# Patient Record
Sex: MALE | Race: WHITE | NOT HISPANIC OR LATINO
[De-identification: names, ages, dates, MRNs, and addresses within clinical notes are randomized per-mention and may not be internally consistent; named-entity substitution may affect disease eponyms.]

---

## 2017-04-27 ENCOUNTER — MESSAGE (OUTPATIENT)
Age: 77
End: 2017-04-27

## 2017-04-27 PROBLEM — Z00.00 ENCOUNTER FOR PREVENTIVE HEALTH EXAMINATION: Status: ACTIVE | Noted: 2017-04-27

## 2017-04-28 ENCOUNTER — MOBILE ON CALL (OUTPATIENT)
Age: 77
End: 2017-04-28

## 2017-04-28 ENCOUNTER — RX RENEWAL (OUTPATIENT)
Age: 77
End: 2017-04-28

## 2017-04-28 RX ORDER — SILDENAFIL 20 MG/1
20 TABLET ORAL
Qty: 30 | Refills: 5 | Status: ACTIVE | COMMUNITY
Start: 2017-04-28 | End: 1900-01-01

## 2017-10-19 ENCOUNTER — APPOINTMENT (OUTPATIENT)
Dept: UROLOGY | Facility: CLINIC | Age: 77
End: 2017-10-19
Payer: MEDICARE

## 2017-10-19 VITALS
HEART RATE: 65 BPM | BODY MASS INDEX: 28.49 KG/M2 | DIASTOLIC BLOOD PRESSURE: 69 MMHG | WEIGHT: 188 LBS | SYSTOLIC BLOOD PRESSURE: 160 MMHG | HEIGHT: 68 IN

## 2017-10-19 DIAGNOSIS — Z78.9 OTHER SPECIFIED HEALTH STATUS: ICD-10-CM

## 2017-10-19 DIAGNOSIS — Z87.891 PERSONAL HISTORY OF NICOTINE DEPENDENCE: ICD-10-CM

## 2017-10-19 DIAGNOSIS — I10 ESSENTIAL (PRIMARY) HYPERTENSION: ICD-10-CM

## 2017-10-19 DIAGNOSIS — Z82.49 FAMILY HISTORY OF ISCHEMIC HEART DISEASE AND OTHER DISEASES OF THE CIRCULATORY SYSTEM: ICD-10-CM

## 2017-10-19 DIAGNOSIS — Z82.3 FAMILY HISTORY OF STROKE: ICD-10-CM

## 2017-10-19 DIAGNOSIS — E78.00 PURE HYPERCHOLESTEROLEMIA, UNSPECIFIED: ICD-10-CM

## 2017-10-19 LAB
BILIRUB UR QL STRIP: NORMAL
CLARITY UR: CLEAR
COLLECTION METHOD: NORMAL
GLUCOSE UR-MCNC: NORMAL
HCG UR QL: NORMAL EU/DL
HGB UR QL STRIP.AUTO: NORMAL
KETONES UR-MCNC: NORMAL
LEUKOCYTE ESTERASE UR QL STRIP: NORMAL
NITRITE UR QL STRIP: NORMAL
PH UR STRIP: 6
PROT UR STRIP-MCNC: NORMAL
SP GR UR STRIP: 1.01

## 2017-10-19 PROCEDURE — 81003 URINALYSIS AUTO W/O SCOPE: CPT | Mod: QW

## 2017-10-19 PROCEDURE — 99214 OFFICE O/P EST MOD 30 MIN: CPT

## 2017-10-19 RX ORDER — AMLODIPINE BESYLATE 5 MG/1
5 TABLET ORAL
Refills: 0 | Status: ACTIVE | COMMUNITY

## 2017-10-19 RX ORDER — LOVASTATIN 40 MG/1
40 TABLET ORAL
Refills: 0 | Status: ACTIVE | COMMUNITY

## 2017-10-19 RX ORDER — VALSARTAN 320 MG/1
320 TABLET, COATED ORAL
Refills: 0 | Status: ACTIVE | COMMUNITY

## 2017-10-20 RX ORDER — SILDENAFIL 20 MG/1
20 TABLET ORAL
Qty: 10 | Refills: 5 | Status: ACTIVE | COMMUNITY
Start: 2017-10-20 | End: 1900-01-01

## 2018-02-08 ENCOUNTER — RX RENEWAL (OUTPATIENT)
Age: 78
End: 2018-02-08

## 2018-02-08 RX ORDER — SILDENAFIL 20 MG/1
20 TABLET ORAL
Qty: 30 | Refills: 5 | Status: ACTIVE | COMMUNITY
Start: 2018-02-08 | End: 1900-01-01

## 2018-08-03 ENCOUNTER — APPOINTMENT (OUTPATIENT)
Dept: OTOLARYNGOLOGY | Facility: CLINIC | Age: 78
End: 2018-08-03
Payer: MEDICARE

## 2018-08-03 VITALS
SYSTOLIC BLOOD PRESSURE: 142 MMHG | DIASTOLIC BLOOD PRESSURE: 88 MMHG | WEIGHT: 180 LBS | HEIGHT: 68 IN | BODY MASS INDEX: 27.28 KG/M2

## 2018-08-03 DIAGNOSIS — Z63.4 DISAPPEARANCE AND DEATH OF FAMILY MEMBER: ICD-10-CM

## 2018-08-03 PROCEDURE — 92557 COMPREHENSIVE HEARING TEST: CPT

## 2018-08-03 PROCEDURE — 99204 OFFICE O/P NEW MOD 45 MIN: CPT | Mod: 25

## 2018-08-03 PROCEDURE — 92567 TYMPANOMETRY: CPT

## 2018-08-03 SDOH — SOCIAL STABILITY - SOCIAL INSECURITY: DISSAPEARANCE AND DEATH OF FAMILY MEMBER: Z63.4

## 2018-08-21 ENCOUNTER — FORM ENCOUNTER (OUTPATIENT)
Age: 78
End: 2018-08-21

## 2018-08-22 ENCOUNTER — OUTPATIENT (OUTPATIENT)
Dept: OUTPATIENT SERVICES | Facility: HOSPITAL | Age: 78
LOS: 1 days | Discharge: HOME | End: 2018-08-22

## 2018-08-22 DIAGNOSIS — H90.2 CONDUCTIVE HEARING LOSS, UNSPECIFIED: ICD-10-CM

## 2018-09-14 ENCOUNTER — APPOINTMENT (OUTPATIENT)
Dept: OTOLARYNGOLOGY | Facility: CLINIC | Age: 78
End: 2018-09-14

## 2018-10-25 ENCOUNTER — APPOINTMENT (OUTPATIENT)
Dept: UROLOGY | Facility: CLINIC | Age: 78
End: 2018-10-25
Payer: MEDICARE

## 2018-10-25 VITALS
DIASTOLIC BLOOD PRESSURE: 67 MMHG | SYSTOLIC BLOOD PRESSURE: 148 MMHG | HEART RATE: 61 BPM | WEIGHT: 194 LBS | BODY MASS INDEX: 29.4 KG/M2 | HEIGHT: 68 IN

## 2018-10-25 DIAGNOSIS — N40.0 BENIGN PROSTATIC HYPERPLASIA WITHOUT LOWER URINARY TRACT SYMPMS: ICD-10-CM

## 2018-10-25 DIAGNOSIS — N52.9 MALE ERECTILE DYSFUNCTION, UNSPECIFIED: ICD-10-CM

## 2018-10-25 LAB
BILIRUB UR QL STRIP: NORMAL
CLARITY UR: CLEAR
COLLECTION METHOD: NORMAL
GLUCOSE UR-MCNC: NORMAL
HCG UR QL: NORMAL EU/DL
HGB UR QL STRIP.AUTO: NORMAL
KETONES UR-MCNC: NORMAL
LEUKOCYTE ESTERASE UR QL STRIP: NORMAL
NITRITE UR QL STRIP: NORMAL
PH UR STRIP: 7
PROT UR STRIP-MCNC: NORMAL
SP GR UR STRIP: 1005

## 2018-10-25 PROCEDURE — 81003 URINALYSIS AUTO W/O SCOPE: CPT | Mod: QW

## 2018-10-25 PROCEDURE — 99213 OFFICE O/P EST LOW 20 MIN: CPT

## 2018-10-25 RX ORDER — SILDENAFIL 20 MG/1
20 TABLET ORAL
Qty: 300 | Refills: 3 | Status: ACTIVE | COMMUNITY
Start: 2018-10-25 | End: 1900-01-01

## 2018-11-09 ENCOUNTER — APPOINTMENT (OUTPATIENT)
Dept: OTOLARYNGOLOGY | Facility: CLINIC | Age: 78
End: 2018-11-09
Payer: COMMERCIAL

## 2018-11-09 DIAGNOSIS — C91.90 LYMPHOID LEUKEMIA, UNSPECIFIED NOT HAVING ACHIEVED REMISSION: ICD-10-CM

## 2018-11-09 PROCEDURE — 92570 ACOUSTIC IMMITANCE TESTING: CPT

## 2018-11-09 PROCEDURE — 92557 COMPREHENSIVE HEARING TEST: CPT

## 2018-11-09 PROCEDURE — 99213 OFFICE O/P EST LOW 20 MIN: CPT | Mod: 25

## 2019-02-25 ENCOUNTER — INPATIENT (INPATIENT)
Facility: HOSPITAL | Age: 79
LOS: 2 days | Discharge: HOME | End: 2019-02-28
Attending: INTERNAL MEDICINE | Admitting: INTERNAL MEDICINE
Payer: MEDICARE

## 2019-02-25 VITALS
TEMPERATURE: 98 F | OXYGEN SATURATION: 98 % | DIASTOLIC BLOOD PRESSURE: 58 MMHG | SYSTOLIC BLOOD PRESSURE: 129 MMHG | RESPIRATION RATE: 19 BRPM | HEART RATE: 74 BPM

## 2019-02-25 DIAGNOSIS — Z98.890 OTHER SPECIFIED POSTPROCEDURAL STATES: Chronic | ICD-10-CM

## 2019-02-25 LAB
ALBUMIN SERPL ELPH-MCNC: 3.6 G/DL — SIGNIFICANT CHANGE UP (ref 3.5–5.2)
ALP SERPL-CCNC: 64 U/L — SIGNIFICANT CHANGE UP (ref 30–115)
ALT FLD-CCNC: 23 U/L — SIGNIFICANT CHANGE UP (ref 0–41)
ANION GAP SERPL CALC-SCNC: 16 MMOL/L — HIGH (ref 7–14)
APTT BLD: 25.7 SEC — LOW (ref 27–39.2)
AST SERPL-CCNC: 31 U/L — SIGNIFICANT CHANGE UP (ref 0–41)
BASE EXCESS BLDV CALC-SCNC: -0.7 MMOL/L — SIGNIFICANT CHANGE UP (ref -2–2)
BILIRUB SERPL-MCNC: 0.6 MG/DL — SIGNIFICANT CHANGE UP (ref 0.2–1.2)
BLD GP AB SCN SERPL QL: SIGNIFICANT CHANGE UP
BUN SERPL-MCNC: 33 MG/DL — HIGH (ref 10–20)
CA-I SERPL-SCNC: 1.16 MMOL/L — SIGNIFICANT CHANGE UP (ref 1.12–1.3)
CALCIUM SERPL-MCNC: 8.2 MG/DL — LOW (ref 8.5–10.1)
CHLORIDE SERPL-SCNC: 98 MMOL/L — SIGNIFICANT CHANGE UP (ref 98–110)
CO2 SERPL-SCNC: 21 MMOL/L — SIGNIFICANT CHANGE UP (ref 17–32)
CREAT SERPL-MCNC: 1.4 MG/DL — SIGNIFICANT CHANGE UP (ref 0.7–1.5)
GAS PNL BLDV: 133 MMOL/L — LOW (ref 136–145)
GAS PNL BLDV: SIGNIFICANT CHANGE UP
GLUCOSE SERPL-MCNC: 168 MG/DL — HIGH (ref 70–99)
HCO3 BLDV-SCNC: 25 MMOL/L — SIGNIFICANT CHANGE UP (ref 22–29)
HCT VFR BLD CALC: 24.7 % — LOW (ref 42–52)
HCT VFR BLDA CALC: 33.7 % — LOW (ref 34–44)
HGB BLD CALC-MCNC: 11 G/DL — LOW (ref 14–18)
HGB BLD-MCNC: 7.4 G/DL — CRITICAL LOW (ref 14–18)
INR BLD: 1.24 RATIO — SIGNIFICANT CHANGE UP (ref 0.65–1.3)
LACTATE BLDV-MCNC: 1.3 MMOL/L — SIGNIFICANT CHANGE UP (ref 0.5–1.6)
MCHC RBC-ENTMCNC: 30 G/DL — LOW (ref 32–37)
MCHC RBC-ENTMCNC: 31.9 PG — HIGH (ref 27–31)
MCV RBC AUTO: 106.5 FL — HIGH (ref 80–94)
NRBC # BLD: 0 /100 WBCS — SIGNIFICANT CHANGE UP (ref 0–0)
NT-PROBNP SERPL-SCNC: 1599 PG/ML — HIGH (ref 0–300)
PCO2 BLDV: 43 MMHG — SIGNIFICANT CHANGE UP (ref 41–51)
PH BLDV: 7.37 — SIGNIFICANT CHANGE UP (ref 7.26–7.43)
PLATELET # BLD AUTO: 92 K/UL — LOW (ref 130–400)
PO2 BLDV: 23 MMHG — SIGNIFICANT CHANGE UP (ref 20–40)
POTASSIUM BLDV-SCNC: 4 MMOL/L — SIGNIFICANT CHANGE UP (ref 3.3–5.6)
POTASSIUM SERPL-MCNC: 4.5 MMOL/L — SIGNIFICANT CHANGE UP (ref 3.5–5)
POTASSIUM SERPL-SCNC: 4.5 MMOL/L — SIGNIFICANT CHANGE UP (ref 3.5–5)
PROT SERPL-MCNC: 5.5 G/DL — LOW (ref 6–8)
PROTHROM AB SERPL-ACNC: 14.2 SEC — HIGH (ref 9.95–12.87)
RBC # BLD: 2.32 M/UL — LOW (ref 4.7–6.1)
RBC # FLD: 17.1 % — HIGH (ref 11.5–14.5)
SAO2 % BLDV: 37 % — SIGNIFICANT CHANGE UP
SODIUM SERPL-SCNC: 135 MMOL/L — SIGNIFICANT CHANGE UP (ref 135–146)
TROPONIN T SERPL-MCNC: <0.01 NG/ML — SIGNIFICANT CHANGE UP
TYPE + AB SCN PNL BLD: SIGNIFICANT CHANGE UP
WBC # BLD: 133.69 K/UL — CRITICAL HIGH (ref 4.8–10.8)
WBC # FLD AUTO: 133.69 K/UL — CRITICAL HIGH (ref 4.8–10.8)

## 2019-02-25 RX ORDER — SODIUM CHLORIDE 9 MG/ML
1000 INJECTION, SOLUTION INTRAVENOUS ONCE
Qty: 0 | Refills: 0 | Status: COMPLETED | OUTPATIENT
Start: 2019-02-25 | End: 2019-02-25

## 2019-02-25 RX ORDER — HEPARIN SODIUM 5000 [USP'U]/ML
5000 INJECTION INTRAVENOUS; SUBCUTANEOUS EVERY 8 HOURS
Qty: 0 | Refills: 0 | Status: DISCONTINUED | OUTPATIENT
Start: 2019-02-25 | End: 2019-02-27

## 2019-02-25 RX ORDER — AMLODIPINE BESYLATE 2.5 MG/1
5 TABLET ORAL DAILY
Qty: 0 | Refills: 0 | Status: DISCONTINUED | OUTPATIENT
Start: 2019-02-25 | End: 2019-02-28

## 2019-02-25 RX ORDER — LOSARTAN POTASSIUM 100 MG/1
1 TABLET, FILM COATED ORAL
Qty: 0 | Refills: 0 | COMMUNITY

## 2019-02-25 RX ORDER — LOSARTAN POTASSIUM 100 MG/1
100 TABLET, FILM COATED ORAL DAILY
Qty: 0 | Refills: 0 | Status: DISCONTINUED | OUTPATIENT
Start: 2019-02-25 | End: 2019-02-28

## 2019-02-25 RX ORDER — ACETAMINOPHEN 500 MG
650 TABLET ORAL EVERY 6 HOURS
Qty: 0 | Refills: 0 | Status: DISCONTINUED | OUTPATIENT
Start: 2019-02-25 | End: 2019-02-28

## 2019-02-25 RX ORDER — AMLODIPINE BESYLATE 2.5 MG/1
1 TABLET ORAL
Qty: 0 | Refills: 0 | COMMUNITY

## 2019-02-25 RX ORDER — LOVASTATIN 20 MG
1 TABLET ORAL
Qty: 0 | Refills: 0 | COMMUNITY

## 2019-02-25 RX ORDER — ATORVASTATIN CALCIUM 80 MG/1
40 TABLET, FILM COATED ORAL AT BEDTIME
Qty: 0 | Refills: 0 | Status: DISCONTINUED | OUTPATIENT
Start: 2019-02-25 | End: 2019-02-28

## 2019-02-25 RX ORDER — AZITHROMYCIN 500 MG/1
500 TABLET, FILM COATED ORAL EVERY 24 HOURS
Qty: 0 | Refills: 0 | Status: DISCONTINUED | OUTPATIENT
Start: 2019-02-25 | End: 2019-02-26

## 2019-02-25 RX ORDER — CEFTRIAXONE 500 MG/1
1 INJECTION, POWDER, FOR SOLUTION INTRAMUSCULAR; INTRAVENOUS EVERY 24 HOURS
Qty: 0 | Refills: 0 | Status: DISCONTINUED | OUTPATIENT
Start: 2019-02-25 | End: 2019-02-26

## 2019-02-25 RX ADMIN — SODIUM CHLORIDE 1000 MILLILITER(S): 9 INJECTION, SOLUTION INTRAVENOUS at 13:42

## 2019-02-25 RX ADMIN — SODIUM CHLORIDE 1000 MILLILITER(S): 9 INJECTION, SOLUTION INTRAVENOUS at 14:56

## 2019-02-25 RX ADMIN — HEPARIN SODIUM 5000 UNIT(S): 5000 INJECTION INTRAVENOUS; SUBCUTANEOUS at 22:56

## 2019-02-25 RX ADMIN — AZITHROMYCIN 255 MILLIGRAM(S): 500 TABLET, FILM COATED ORAL at 22:55

## 2019-02-25 RX ADMIN — ATORVASTATIN CALCIUM 40 MILLIGRAM(S): 80 TABLET, FILM COATED ORAL at 22:56

## 2019-02-25 NOTE — ED PROVIDER NOTE - OBJECTIVE STATEMENT
77 yo M with a hx of CLL, HTN, HLD, BPH, presents with cough and sob x 3 days. Exertional dyspnea progressively worsening. Asssociated with chills. Denies fevers, chills, abd pain, NVCD, CP, dysuria, back pain.

## 2019-02-25 NOTE — H&P ADULT - ATTENDING COMMENTS
Pt seen and examined at bedside independently, pt is c/o generalized weakness and cough, he had a high fever at home, received flu shot back in November. Pt has h/o CLL, not on treatment, follows up with oncology, known to have anemia w/o any signs of active bleeding.   He was admitted for community acquired PNA in immunocompromised.  I agree with residents findings, assessment and plan above with few corrections below.     Vital Signs Last 24 Hrs  T(C): 35.9 (26 Feb 2019 04:51), Max: 37.6 (25 Feb 2019 20:15)  T(F): 96.6 (26 Feb 2019 04:51), Max: 99.7 (25 Feb 2019 20:15)  HR: 68 (26 Feb 2019 14:19) (66 - 70)  BP: 128/62 (26 Feb 2019 14:19) (126/75 - 135/63)  BP(mean): --  RR: 18 (26 Feb 2019 14:19) (18 - 18)  SpO2: 99% (26 Feb 2019 09:32) (94% - 99%)    PHYSICAL EXAM:  GENERAL: AAOx 3 , NAD  NECK: suppler, no JVD  Skin: pale, no rash   CV: S1, S2, no murmur  Lungs: rales on right base, no wheezing   Abd/GI: soft, NT, Nd, BS present   EXT: trace edema on LE b/l     LABs:                        7.3    110.30 )-----------( 101      ( 26 Feb 2019 12:15 )             24.9   02-26    140  |  102  |  28<H>  ----------------------------<  147<H>  4.2   |  23  |  1.1    Ca    8.5      26 Feb 2019 12:15  Mg     2.7     02-26    TPro  5.4<L>  /  Alb  3.5  /  TBili  0.4  /  DBili  x   /  AST  30  /  ALT  24  /  AlkPhos  67  02-26    RADIOLOGY:  < from: Xray Chest 2 Views PA/Lat (02.25.19 @ 14:01) >    Impression:    Right lower lobe opacity consistent with pneumonia. Small pleural   effusion.     A/P  # Community acquired PNA in immunocompromised patient   - started on Cefepime and Vancomycin  - collect sputum Cx  - ID consult  - supportive treatment  - supplement oxygen, monitor pulse Ox  - nebs Q 4 hours   - Robitussin PRN  - swab for Influenza   - send nasal swab for MRSA     # CLL/ Leukocytosis  - not on treatment, f/u with oncology after discharge   - WBC trending down for last 24 hours    # Anemia  - send anemia work up  - monitor H/H, keep Hemoglobin above 7.0  - no active bleeding noted     # Elevated BNP with trace LE edema  - check 2DECho  - pt denies any h/o CAD    # Thrombocytopenia  - monitor platelets   - SCD for DVT prophylaxis  - encourage ambulation     # GI/DVT prophylaxis

## 2019-02-25 NOTE — H&P ADULT - PSH
History of moshe hole surgery    History of gastric surgery    History of lung surgery  related to infection

## 2019-02-25 NOTE — H&P ADULT - NSHPLABSRESULTS_GEN_ALL_CORE
7.4    133.69 )-----------( 92       ( 25 Feb 2019 13:19 )             24.7     02-25    135  |  98  |  33<H>  ----------------------------<  168<H>  4.5   |  21  |  1.4    Ca    8.2<L>      25 Feb 2019 13:19    TPro  5.5<L>  /  Alb  3.6  /  TBili  0.6  /  DBili  x   /  AST  31  /  ALT  23  /  AlkPhos  64  02-25      PT/INR - ( 25 Feb 2019 13:19 )   PT: 14.20 sec;   INR: 1.24 ratio    PTT - ( 25 Feb 2019 13:19 )  PTT:25.7 sec    CARDIAC MARKERS ( 25 Feb 2019 13:19 )  x     / <0.01 ng/mL / x     / x     / x        Serum Pro-Brain Natriuretic Peptide: 1599 pg/mL (02.25.19 @ 13:19)      Xray Chest 2 Views PA/Lat (02.25.19 @ 14:01) >  Right lower lobe opacity consistent with pneumonia. Small pleural   effusion.   No pneumothorax

## 2019-02-25 NOTE — ED PROVIDER NOTE - PHYSICAL EXAMINATION
AOx4, Non toxic appearing, NAD, speaking in full sentences.   Skin - warm and dry, no acute rash.   Head - normocephalic, atraumatic.   Eyes - PERRLA/EOMI, conjunctiva and sclera clear.   ENT- MM moist, no nasal discharge.  Pharynx unremarkable.  No mastoid or temporal ttp.   Neck - supple nt, no meningeal signs.   Heart - RRR s1s2 nl, no rub/murmur.   Lungs- No retractions, Crackels at the right base.   Abdomen - soft ntnd no r/g.   Extremities- moves all, +equal distal pulses, brisk cap refill, sensation wnl, normal ROM. No LE edema, calves nttp b/l.

## 2019-02-25 NOTE — H&P ADULT - PMH
CLL (chronic lymphocytic leukemia)    HLD (hyperlipidemia)    HTN (hypertension)    Subdural hematoma

## 2019-02-25 NOTE — ED PROVIDER NOTE - NS ED ROS FT
Constitutional: See HPI.  Eyes: No visual changes, eye pain or discharge. No Photophobia  ENMT: No neck pain or stiffness. No limited ROM  Cardiac: No edema. No chest pain with exertion.  Respiratory: see phi  GI: No nausea, vomiting, diarrhea or abdominal pain.  : No dysuria, frequency or burning. No Discharge  MS: No myalgia, muscle weakness, joint pain or back pain.  Neuro: No headache or weakness. No LOC.  Skin: No skin rash.  Except as documented in the HPI, all other systems are negative.

## 2019-02-25 NOTE — ED PROVIDER NOTE - ATTENDING CONTRIBUTION TO CARE
78 y M to ED with SOB  h/o CLL, HTN, presents with fever, cough and SOB x 3d, no sick contacts, no travels, no HA but + chills and exertional Dyspnea.    AVSS, exam as noted, rhonchi on R base, RRR, abdomen soft NTND, (+) bowel sounds, neuro nonfocal

## 2019-02-25 NOTE — H&P ADULT - ASSESSMENT
79 yo M with CLL not on chemotherapy, HTN and DLD presenting with worsening SOB, cough and fever x4 days.     - Sepsis 2/2 CAP  no O2 requirements, CURB 65 of 2   IV abxs: ceftriaxone and azithromycin  consider switching to po levaquin in 24 hrs  fu blood cultures  encourage po hydration     - CLL  currently off chemotherapy  fu with oncologist as OP     - MAcrocytic Anemia/ thrombocytopenia  likely 2/2 CLL  no baseline. please recall dr Gusman in am for baseline CBC  fu repeat CBC and transfuse if Hb<7  fu folate and B12 levels     - HTN  c/w losartan and amlodipine     - Elevated Crea  no baseline  fu BMP  encourage po hydration    - GI/ DVT ppx  not indicated, heparin SQ  DC heparin if plat drop further

## 2019-02-25 NOTE — H&P ADULT - HISTORY OF PRESENT ILLNESS
79 yo M with CLL not on chemotherapy, HTN and DLD presenting with worsening SOB and cough x4 days. Pt also reports fever Tmax 102 x1 day duration. no chills. + productive cough of brownish sputum.  Pt states that his SOB is worse with minimal exertion which is different from his baseline  Pt denies any chest pain, upper resp symptoms, GI or  symptoms  Pt reports easy bruisability related to low platelets and intermittent spontaneous nasal bleeds. He denies any GI bleeding. he doesnt recall his Hb level

## 2019-02-26 LAB
ALBUMIN SERPL ELPH-MCNC: 3.5 G/DL — SIGNIFICANT CHANGE UP (ref 3.5–5.2)
ALBUMIN SERPL ELPH-MCNC: 3.7 G/DL — SIGNIFICANT CHANGE UP (ref 3.5–5.2)
ALP SERPL-CCNC: 67 U/L — SIGNIFICANT CHANGE UP (ref 30–115)
ALP SERPL-CCNC: 69 U/L — SIGNIFICANT CHANGE UP (ref 30–115)
ALT FLD-CCNC: 24 U/L — SIGNIFICANT CHANGE UP (ref 0–41)
ALT FLD-CCNC: 26 U/L — SIGNIFICANT CHANGE UP (ref 0–41)
ANION GAP SERPL CALC-SCNC: 15 MMOL/L — HIGH (ref 7–14)
ANION GAP SERPL CALC-SCNC: 17 MMOL/L — HIGH (ref 7–14)
AST SERPL-CCNC: 30 U/L — SIGNIFICANT CHANGE UP (ref 0–41)
AST SERPL-CCNC: 30 U/L — SIGNIFICANT CHANGE UP (ref 0–41)
BASOPHILS # BLD AUTO: 0 K/UL — SIGNIFICANT CHANGE UP (ref 0–0.2)
BASOPHILS NFR BLD AUTO: 0 % — SIGNIFICANT CHANGE UP (ref 0–1)
BILIRUB SERPL-MCNC: 0.4 MG/DL — SIGNIFICANT CHANGE UP (ref 0.2–1.2)
BILIRUB SERPL-MCNC: 0.5 MG/DL — SIGNIFICANT CHANGE UP (ref 0.2–1.2)
BUN SERPL-MCNC: 27 MG/DL — HIGH (ref 10–20)
BUN SERPL-MCNC: 28 MG/DL — HIGH (ref 10–20)
CALCIUM SERPL-MCNC: 8.5 MG/DL — SIGNIFICANT CHANGE UP (ref 8.5–10.1)
CALCIUM SERPL-MCNC: 8.8 MG/DL — SIGNIFICANT CHANGE UP (ref 8.5–10.1)
CHLORIDE SERPL-SCNC: 101 MMOL/L — SIGNIFICANT CHANGE UP (ref 98–110)
CHLORIDE SERPL-SCNC: 102 MMOL/L — SIGNIFICANT CHANGE UP (ref 98–110)
CO2 SERPL-SCNC: 22 MMOL/L — SIGNIFICANT CHANGE UP (ref 17–32)
CO2 SERPL-SCNC: 23 MMOL/L — SIGNIFICANT CHANGE UP (ref 17–32)
CREAT SERPL-MCNC: 1.1 MG/DL — SIGNIFICANT CHANGE UP (ref 0.7–1.5)
CREAT SERPL-MCNC: 1.2 MG/DL — SIGNIFICANT CHANGE UP (ref 0.7–1.5)
EOSINOPHIL # BLD AUTO: 0 K/UL — SIGNIFICANT CHANGE UP (ref 0–0.7)
EOSINOPHIL NFR BLD AUTO: 0 % — SIGNIFICANT CHANGE UP (ref 0–8)
FLU A RESULT: NEGATIVE — SIGNIFICANT CHANGE UP
FLU A RESULT: NEGATIVE — SIGNIFICANT CHANGE UP
FLUAV AG NPH QL: NEGATIVE — SIGNIFICANT CHANGE UP
FLUBV AG NPH QL: NEGATIVE — SIGNIFICANT CHANGE UP
FOLATE SERPL-MCNC: >20 NG/ML — SIGNIFICANT CHANGE UP
GLUCOSE SERPL-MCNC: 147 MG/DL — HIGH (ref 70–99)
GLUCOSE SERPL-MCNC: 240 MG/DL — HIGH (ref 70–99)
HCT VFR BLD CALC: 24.9 % — LOW (ref 42–52)
HCT VFR BLD CALC: 26.3 % — LOW (ref 42–52)
HGB BLD-MCNC: 7.3 G/DL — CRITICAL LOW (ref 14–18)
HGB BLD-MCNC: 7.8 G/DL — LOW (ref 14–18)
LYMPHOCYTES # BLD AUTO: 88 % — HIGH (ref 20.5–51.1)
LYMPHOCYTES # BLD AUTO: 97.42 K/UL — HIGH (ref 1.2–3.4)
MAGNESIUM SERPL-MCNC: 2.7 MG/DL — HIGH (ref 1.8–2.4)
MAGNESIUM SERPL-MCNC: 2.7 MG/DL — HIGH (ref 1.8–2.4)
MCHC RBC-ENTMCNC: 29.3 G/DL — LOW (ref 32–37)
MCHC RBC-ENTMCNC: 29.7 G/DL — LOW (ref 32–37)
MCHC RBC-ENTMCNC: 31.2 PG — HIGH (ref 27–31)
MCHC RBC-ENTMCNC: 31.6 PG — HIGH (ref 27–31)
MCV RBC AUTO: 106.4 FL — HIGH (ref 80–94)
MCV RBC AUTO: 106.5 FL — HIGH (ref 80–94)
MONOCYTES # BLD AUTO: 1.88 K/UL — HIGH (ref 0.1–0.6)
MONOCYTES NFR BLD AUTO: 1.7 % — SIGNIFICANT CHANGE UP (ref 1.7–9.3)
NEUTROPHILS # BLD AUTO: 7.53 K/UL — HIGH (ref 1.4–6.5)
NEUTROPHILS NFR BLD AUTO: 5.1 % — LOW (ref 42.2–75.2)
NRBC # BLD: 0 /100 WBCS — SIGNIFICANT CHANGE UP (ref 0–0)
PLATELET # BLD AUTO: 101 K/UL — LOW (ref 130–400)
PLATELET # BLD AUTO: 103 K/UL — LOW (ref 130–400)
POTASSIUM SERPL-MCNC: 4 MMOL/L — SIGNIFICANT CHANGE UP (ref 3.5–5)
POTASSIUM SERPL-MCNC: 4.2 MMOL/L — SIGNIFICANT CHANGE UP (ref 3.5–5)
POTASSIUM SERPL-SCNC: 4 MMOL/L — SIGNIFICANT CHANGE UP (ref 3.5–5)
POTASSIUM SERPL-SCNC: 4.2 MMOL/L — SIGNIFICANT CHANGE UP (ref 3.5–5)
PROT SERPL-MCNC: 5.4 G/DL — LOW (ref 6–8)
PROT SERPL-MCNC: 5.7 G/DL — LOW (ref 6–8)
RBC # BLD: 2.34 M/UL — LOW (ref 4.7–6.1)
RBC # BLD: 2.47 M/UL — LOW (ref 4.7–6.1)
RBC # FLD: 17 % — HIGH (ref 11.5–14.5)
RBC # FLD: 17.1 % — HIGH (ref 11.5–14.5)
RSV RESULT: NEGATIVE — SIGNIFICANT CHANGE UP
RSV RNA RESP QL NAA+PROBE: NEGATIVE — SIGNIFICANT CHANGE UP
SODIUM SERPL-SCNC: 140 MMOL/L — SIGNIFICANT CHANGE UP (ref 135–146)
SODIUM SERPL-SCNC: 140 MMOL/L — SIGNIFICANT CHANGE UP (ref 135–146)
VIT B12 SERPL-MCNC: 704 PG/ML — SIGNIFICANT CHANGE UP (ref 232–1245)
WBC # BLD: 110.3 K/UL — CRITICAL HIGH (ref 4.8–10.8)
WBC # BLD: 110.71 K/UL — CRITICAL HIGH (ref 4.8–10.8)
WBC # FLD AUTO: 110.3 K/UL — CRITICAL HIGH (ref 4.8–10.8)
WBC # FLD AUTO: 110.71 K/UL — CRITICAL HIGH (ref 4.8–10.8)

## 2019-02-26 RX ORDER — VANCOMYCIN HCL 1 G
1000 VIAL (EA) INTRAVENOUS EVERY 12 HOURS
Qty: 0 | Refills: 0 | Status: DISCONTINUED | OUTPATIENT
Start: 2019-02-26 | End: 2019-02-27

## 2019-02-26 RX ORDER — CEFEPIME 1 G/1
1000 INJECTION, POWDER, FOR SOLUTION INTRAMUSCULAR; INTRAVENOUS EVERY 8 HOURS
Qty: 0 | Refills: 0 | Status: DISCONTINUED | OUTPATIENT
Start: 2019-02-26 | End: 2019-02-28

## 2019-02-26 RX ORDER — CEFEPIME 1 G/1
1000 INJECTION, POWDER, FOR SOLUTION INTRAMUSCULAR; INTRAVENOUS ONCE
Qty: 0 | Refills: 0 | Status: COMPLETED | OUTPATIENT
Start: 2019-02-26 | End: 2019-02-26

## 2019-02-26 RX ORDER — CEFEPIME 1 G/1
INJECTION, POWDER, FOR SOLUTION INTRAMUSCULAR; INTRAVENOUS
Qty: 0 | Refills: 0 | Status: DISCONTINUED | OUTPATIENT
Start: 2019-02-26 | End: 2019-02-28

## 2019-02-26 RX ADMIN — CEFTRIAXONE 100 GRAM(S): 500 INJECTION, POWDER, FOR SOLUTION INTRAMUSCULAR; INTRAVENOUS at 00:21

## 2019-02-26 RX ADMIN — ATORVASTATIN CALCIUM 40 MILLIGRAM(S): 80 TABLET, FILM COATED ORAL at 21:51

## 2019-02-26 RX ADMIN — CEFEPIME 100 MILLIGRAM(S): 1 INJECTION, POWDER, FOR SOLUTION INTRAMUSCULAR; INTRAVENOUS at 12:07

## 2019-02-26 RX ADMIN — Medication 250 MILLIGRAM(S): at 13:47

## 2019-02-26 RX ADMIN — AMLODIPINE BESYLATE 5 MILLIGRAM(S): 2.5 TABLET ORAL at 05:48

## 2019-02-26 RX ADMIN — HEPARIN SODIUM 5000 UNIT(S): 5000 INJECTION INTRAVENOUS; SUBCUTANEOUS at 21:51

## 2019-02-26 RX ADMIN — HEPARIN SODIUM 5000 UNIT(S): 5000 INJECTION INTRAVENOUS; SUBCUTANEOUS at 05:48

## 2019-02-26 RX ADMIN — CEFEPIME 100 MILLIGRAM(S): 1 INJECTION, POWDER, FOR SOLUTION INTRAMUSCULAR; INTRAVENOUS at 22:32

## 2019-02-26 RX ADMIN — HEPARIN SODIUM 5000 UNIT(S): 5000 INJECTION INTRAVENOUS; SUBCUTANEOUS at 13:47

## 2019-02-26 RX ADMIN — Medication 250 MILLIGRAM(S): at 21:51

## 2019-02-26 RX ADMIN — LOSARTAN POTASSIUM 100 MILLIGRAM(S): 100 TABLET, FILM COATED ORAL at 05:48

## 2019-02-26 NOTE — PATIENT PROFILE ADULT - VISION (WITH CORRECTIVE LENSES IF THE PATIENT USUALLY WEARS THEM):
Normal vision: sees adequately in most situations; can see medication labels, newsprint/glasses at bedside for distance and reading

## 2019-02-26 NOTE — PATIENT PROFILE ADULT - SPECIFY WHICH ONES ARE ON CHART
Medical Orders for Life-Sustaining Treatment (MOLST)/Do Not Resuscitate (DNR)/MD to fill out DNR/DNI / Molst form with pt; HCP form pt will fill out a new form now/Health Care Proxy (HCP)

## 2019-02-26 NOTE — PATIENT PROFILE ADULT - ABILITY TO HEAR (WITH HEARING AID OR HEARING APPLIANCE IF NORMALLY USED):
Mildly to Moderately Impaired: difficulty hearing in some environments or speaker may need to increase volume or speak distinctly/pt states he is having difficulty hearing the past 3-4 days, MD Magaña notified.

## 2019-02-26 NOTE — PATIENT PROFILE ADULT - INDICATE TYPE
Do Not Resuscitate (DNR)/Medical Orders for Life-Sustaining Treatment (MOLST)/pt wishes to be DNR/DNI/Health Care Proxy (HCP)

## 2019-02-26 NOTE — PROGRESS NOTE ADULT - ASSESSMENT
________________________________________________________________________________  DAILY PROGRESS NOTE:    ================== SUBJECTIVE ==================    SHI VELA  /   78y  /  Male  /  MRN#: [id number]  Patient is a 78y old Male who presents with a chief complaint of SOB/ cough (25 Feb 2019 16:05)  Currently admitted to medicine with the primary diagnosis of Pneumonia      HOSPITAL DAY: 1d     OVERNIGHT EVENTS:     TODAY: Patient was seen this morning at bedside. Currently, the patient reports ....    Review of systems is otherwise negative.    =================== OBJECTIVE ===================    VITAL SIGNS: Last 24 Hours  T(C): 35.9 (26 Feb 2019 04:51), Max: 37.6 (25 Feb 2019 20:15)  T(F): 96.6 (26 Feb 2019 04:51), Max: 99.7 (25 Feb 2019 20:15)  HR: 66 (26 Feb 2019 04:51) (66 - 70)  BP: 130/62 (26 Feb 2019 04:51) (126/75 - 135/63)  BP(mean): --  RR: 18 (26 Feb 2019 04:51) (18 - 18)  SpO2: 99% (26 Feb 2019 09:32) (94% - 99%)    PHYSICAL EXAM:  GENERAL: NAD, well-developed  CHEST/LUNG: Clear to auscultation bilaterally; No wheeze, no crackles.   HEART: Regular rate and rhythm; No murmurs, rubs, or gallops  ABDOMEN: Soft, Nontender, Nondistended; Bowel sounds present  EXTREMITIES:  2+ Peripheral Pulses, No clubbing, cyanosis, or edema  PSYCH: AAOx3  NEUROLOGY: non-focal  General:  Appearance is consistent with chronologic age.  No abnormal facies.   General: AA&Ox3.  Fund of knowledge is intact and normal.  Language with normal repetition, comprehension and naming.   SKIN: No rashes or lesions      ===================== LABS =====================                        7.3    110.30 )-----------( 101      ( 26 Feb 2019 12:15 )             24.9     02-26    140  |  101  |  27<H>  ----------------------------<  240<H>  4.0   |  22  |  1.2    Ca    8.8      26 Feb 2019 09:35  Mg     2.7     02-26    TPro  5.7<L>  /  Alb  3.7  /  TBili  0.5  /  DBili  x   /  AST  30  /  ALT  26  /  AlkPhos  69  02-26    PT/INR - ( 25 Feb 2019 13:19 )   PT: 14.20 sec;   INR: 1.24 ratio         PTT - ( 25 Feb 2019 13:19 )  PTT:25.7 sec      Troponin T, Serum: <0.01 ng/mL (02-25-19 @ 13:19)    CARDIAC MARKERS ( 25 Feb 2019 13:19 )  x     / <0.01 ng/mL / x     / x     / x        ================== IMAGING ==================    < from: Xray Chest 2 Views PA/Lat (02.25.19 @ 14:01) >  Impression:    Right lower lobe opacity consistent with pneumonia. Small pleural   effusion.     No pneumothorax    < end of copied text >    ================== ALLERGIES ===================  No Known Allergies    ==================== MEDS =====================  amLODIPine   Tablet 5 milliGRAM(s) Oral daily  atorvastatin 40 milliGRAM(s) Oral at bedtime  cefepime   IVPB      cefepime   IVPB 1000 milliGRAM(s) IV Intermittent every 8 hours  heparin  Injectable 5000 Unit(s) SubCutaneous every 8 hours  losartan 100 milliGRAM(s) Oral daily  vancomycin  IVPB 1000 milliGRAM(s) IV Intermittent every 12 hours    PRN MEDICATIONS  acetaminophen   Tablet .. 650 milliGRAM(s) Oral every 6 hours PRN      ================= ASSESS/PLAN ==================  Patient is a 78y old Male who presents with a chief complaint of SOB/ cough (25 Feb 2019 16:05)  Currently admitted to medicine with the primary diagnosis of Pneumonia      PLAN  #Sepsis 2/2 CAP  CURB 65 of 2, rest pulse ox is 97%, ambulation is 96%   was ceftriaxone and azithromycin day 2  - Cefepime, vanco (Day 1)   - Blood cultures pending   - Sent nasal flu swab     #CLL  currently off chemotherapy  fu with oncologist as OP    #MAcrocytic Anemia/ thrombocytopenia  likely 2/2 CLL  Hemoglbin stable  - Will call dr Gusman in am for baseline CBC  - TSH, T4, folate and B12 pending     #HTN  - c/w losartan and amlodipine    #Elevated Crea - RICCO vs CKD. No baseline  Downtrending   - Daily BMP     GI PPX: -  DVT PPX: Heparin     DIET: DASH  ACTIVITY:  () Ad [place]oina  /  (X) Advance as Tolerated  /  () Bed Rest  /  () Fall Precaution  /  () Seizure precaution    ================= PRESENT TODAY ==================    1-Caldera Catheter: No  Indication:  2-Vascular Access:  [X]Peripheral | Indication: Antibiotics   []Midline | Indication:   []PICC Line | Indication:  []CVC | Indication:  []Arterial Line | Indication:  []Uldall Catheter | Indication:   3-IV Fluids: | Indication:  4-Ventilation: NC 2L | Sat: 98%     ================= DISPOSITION ==================    Patient to be discharged when condition(s) optimized.            Discharge to:  (X) Home  /  () SNF  /  () [facility]HA /  () Hospice / () Other     ================= CODE STATUS =================                  (X) FULL CODE     |     () DNR     |     () DNI    () Discussion with patient and/or family regarding goals of care

## 2019-02-27 DIAGNOSIS — D69.6 THROMBOCYTOPENIA, UNSPECIFIED: ICD-10-CM

## 2019-02-27 DIAGNOSIS — J18.9 PNEUMONIA, UNSPECIFIED ORGANISM: ICD-10-CM

## 2019-02-27 DIAGNOSIS — I10 ESSENTIAL (PRIMARY) HYPERTENSION: ICD-10-CM

## 2019-02-27 DIAGNOSIS — E87.70 FLUID OVERLOAD, UNSPECIFIED: ICD-10-CM

## 2019-02-27 DIAGNOSIS — D64.9 ANEMIA, UNSPECIFIED: ICD-10-CM

## 2019-02-27 DIAGNOSIS — C91.90 LYMPHOID LEUKEMIA, UNSPECIFIED NOT HAVING ACHIEVED REMISSION: ICD-10-CM

## 2019-02-27 DIAGNOSIS — E78.5 HYPERLIPIDEMIA, UNSPECIFIED: ICD-10-CM

## 2019-02-27 LAB
ANION GAP SERPL CALC-SCNC: 11 MMOL/L — SIGNIFICANT CHANGE UP (ref 7–14)
BASOPHILS # BLD AUTO: 0.05 K/UL — SIGNIFICANT CHANGE UP (ref 0–0.2)
BASOPHILS NFR BLD AUTO: 0 % — SIGNIFICANT CHANGE UP (ref 0–1)
BLD GP AB SCN SERPL QL: SIGNIFICANT CHANGE UP
BUN SERPL-MCNC: 24 MG/DL — HIGH (ref 10–20)
CALCIUM SERPL-MCNC: 8.6 MG/DL — SIGNIFICANT CHANGE UP (ref 8.5–10.1)
CHLORIDE SERPL-SCNC: 110 MMOL/L — SIGNIFICANT CHANGE UP (ref 98–110)
CO2 SERPL-SCNC: 22 MMOL/L — SIGNIFICANT CHANGE UP (ref 17–32)
CREAT SERPL-MCNC: 1.1 MG/DL — SIGNIFICANT CHANGE UP (ref 0.7–1.5)
EOSINOPHIL # BLD AUTO: 0.16 K/UL — SIGNIFICANT CHANGE UP (ref 0–0.7)
EOSINOPHIL NFR BLD AUTO: 0.1 % — SIGNIFICANT CHANGE UP (ref 0–8)
GLUCOSE SERPL-MCNC: 138 MG/DL — HIGH (ref 70–99)
HCT VFR BLD CALC: 24.7 % — LOW (ref 42–52)
HGB BLD-MCNC: 7.4 G/DL — CRITICAL LOW (ref 14–18)
IMM GRANULOCYTES NFR BLD AUTO: 0.4 % — HIGH (ref 0.1–0.3)
LYMPHOCYTES # BLD AUTO: 104.22 K/UL — HIGH (ref 1.2–3.4)
LYMPHOCYTES # BLD AUTO: 91.6 % — HIGH (ref 20.5–51.1)
MCHC RBC-ENTMCNC: 30 G/DL — LOW (ref 32–37)
MCHC RBC-ENTMCNC: 31.8 PG — HIGH (ref 27–31)
MCV RBC AUTO: 106 FL — HIGH (ref 80–94)
MONOCYTES # BLD AUTO: 4.01 K/UL — HIGH (ref 0.1–0.6)
MONOCYTES NFR BLD AUTO: 3.5 % — SIGNIFICANT CHANGE UP (ref 1.7–9.3)
NEUTROPHILS # BLD AUTO: 4.87 K/UL — SIGNIFICANT CHANGE UP (ref 1.4–6.5)
NEUTROPHILS NFR BLD AUTO: 4.4 % — LOW (ref 42.2–75.2)
NRBC # BLD: 0 /100 WBCS — SIGNIFICANT CHANGE UP (ref 0–0)
PLATELET # BLD AUTO: 108 K/UL — LOW (ref 130–400)
POTASSIUM SERPL-MCNC: 4.2 MMOL/L — SIGNIFICANT CHANGE UP (ref 3.5–5)
POTASSIUM SERPL-SCNC: 4.2 MMOL/L — SIGNIFICANT CHANGE UP (ref 3.5–5)
RAPID RVP RESULT: SIGNIFICANT CHANGE UP
RBC # BLD: 2.33 M/UL — LOW (ref 4.7–6.1)
RBC # FLD: 17 % — HIGH (ref 11.5–14.5)
SODIUM SERPL-SCNC: 143 MMOL/L — SIGNIFICANT CHANGE UP (ref 135–146)
T4 AB SER-ACNC: 7.1 UG/DL — SIGNIFICANT CHANGE UP (ref 4.6–12)
TSH SERPL-MCNC: 2.68 UIU/ML — SIGNIFICANT CHANGE UP (ref 0.27–4.2)
TYPE + AB SCN PNL BLD: SIGNIFICANT CHANGE UP
WBC # BLD: 113.77 K/UL — CRITICAL HIGH (ref 4.8–10.8)
WBC # FLD AUTO: 113.77 K/UL — CRITICAL HIGH (ref 4.8–10.8)

## 2019-02-27 PROCEDURE — 93970 EXTREMITY STUDY: CPT | Mod: 26

## 2019-02-27 RX ADMIN — ATORVASTATIN CALCIUM 40 MILLIGRAM(S): 80 TABLET, FILM COATED ORAL at 21:26

## 2019-02-27 RX ADMIN — CEFEPIME 100 MILLIGRAM(S): 1 INJECTION, POWDER, FOR SOLUTION INTRAMUSCULAR; INTRAVENOUS at 21:26

## 2019-02-27 RX ADMIN — Medication 250 MILLIGRAM(S): at 05:46

## 2019-02-27 RX ADMIN — CEFEPIME 100 MILLIGRAM(S): 1 INJECTION, POWDER, FOR SOLUTION INTRAMUSCULAR; INTRAVENOUS at 05:39

## 2019-02-27 RX ADMIN — HEPARIN SODIUM 5000 UNIT(S): 5000 INJECTION INTRAVENOUS; SUBCUTANEOUS at 05:40

## 2019-02-27 RX ADMIN — LOSARTAN POTASSIUM 100 MILLIGRAM(S): 100 TABLET, FILM COATED ORAL at 05:40

## 2019-02-27 RX ADMIN — CEFEPIME 100 MILLIGRAM(S): 1 INJECTION, POWDER, FOR SOLUTION INTRAMUSCULAR; INTRAVENOUS at 13:09

## 2019-02-27 RX ADMIN — AMLODIPINE BESYLATE 5 MILLIGRAM(S): 2.5 TABLET ORAL at 05:40

## 2019-02-27 NOTE — CONSULT NOTE ADULT - SUBJECTIVE AND OBJECTIVE BOX
SHI VELA  78y, Male  Allergy: No Known Allergies      HPI:  77 yo M with CLL not on chemotherapy, HTN and DLD presenting with worsening SOB and cough x4 days. Pt also reports fever Tmax 102 x1 day duration. no chills. + productive cough of brownish sputum.  Pt states that his SOB is worse with minimal exertion which is different from his baseline  Pt denies any chest pain, upper resp symptoms, GI or  symptoms  Pt reports easy bruisability related to low platelets and intermittent spontaneous nasal bleeds. He denies any GI bleeding. he doesnt recall his Hb level (25 Feb 2019 16:05)    FAMILY HISTORY:  No pertinent family history in first degree relatives    PAST MEDICAL & SURGICAL HISTORY:  Subdural hematoma  HLD (hyperlipidemia)  HTN (hypertension)  CLL (chronic lymphocytic leukemia)  History of gastric surgery  History of lung surgery: related to infection  History of moshe hole surgery      ROS negative except as per HPI    VITALS:  T(F): 97, Max: 97.1 (02-26-19 @ 19:34)  HR: 63  BP: 175/72  RR: 19Vital Signs Last 24 Hrs  T(C): 36.1 (27 Feb 2019 04:47), Max: 36.2 (26 Feb 2019 19:34)  T(F): 97 (27 Feb 2019 04:47), Max: 97.1 (26 Feb 2019 19:34)  HR: 63 (27 Feb 2019 04:47) (63 - 71)  BP: 175/72 (27 Feb 2019 04:47) (128/62 - 175/72)  BP(mean): --  RR: 19 (27 Feb 2019 04:47) (18 - 19)  SpO2: 93% (27 Feb 2019 08:20) (93% - 93%)    PHYSICAL EXAM:  GENERAL: NAD, well-developed  CHEST: Clear to auscultation bilaterally; No wheeze, no crackles.   HEART: Regular rate and rhythm; No murmurs, rubs, or gallops  ABDOMEN: Soft, Nontender, Nondistended; Bowel sounds present  EXTREMITIES:  2+ Peripheral Pulses, No clubbing, cyanosis, or edema  NEUROLOGY: non-focal, AAOx3    TESTS & MEASUREMENTS:                        7.4    113.77 )-----------( 108      ( 27 Feb 2019 08:15 )             24.7     02-27    143  |  110  |  24<H>  ----------------------------<  138<H>  4.2   |  22  |  1.1    Ca    8.6      27 Feb 2019 08:15  Mg     2.7     02-26    TPro  5.4<L>  /  Alb  3.5  /  TBili  0.4  /  DBili  x   /  AST  30  /  ALT  24  /  AlkPhos  67  02-26    LIVER FUNCTIONS - ( 26 Feb 2019 12:15 )  Alb: 3.5 g/dL / Pro: 5.4 g/dL / ALK PHOS: 67 U/L / ALT: 24 U/L / AST: 30 U/L / GGT: x                   RADIOLOGY & ADDITIONAL TESTS:    < from: Xray Chest 2 Views PA/Lat (02.25.19 @ 14:01) >  Impression:    Right lower lobe opacity consistent with pneumonia. Small pleural   effusion.     No pneumothorax    < end of copied text >      ANTIBIOTICS:  azithromycin  IVPB   255 mL/Hr IV Intermittent (02-25-19 @ 22:55)    cefepime   IVPB   100 mL/Hr IV Intermittent (02-26-19 @ 12:07)    cefepime   IVPB   100 mL/Hr IV Intermittent (02-27-19 @ 05:39)   100 mL/Hr IV Intermittent (02-26-19 @ 22:32)    cefTRIAXone   IVPB   100 mL/Hr IV Intermittent (02-26-19 @ 00:21)    levoFLOXacin IVPB   100 mL/Hr IV Intermittent (02-25-19 @ 15:14)    vancomycin  IVPB   250 mL/Hr IV Intermittent (02-27-19 @ 05:46)   250 mL/Hr IV Intermittent (02-26-19 @ 21:51)   250 mL/Hr IV Intermittent (02-26-19 @ 13:47)

## 2019-02-27 NOTE — PROGRESS NOTE ADULT - ASSESSMENT
________________________________________________________________________________  DAILY PROGRESS NOTE:    ================== SUBJECTIVE ==================    SHI VELA  /   78y  /  Male  /  MRN#: [id number]  Patient is a 78y old Male who presents with a chief complaint of SOB/ cough (26 Feb 2019 13:08)  Currently admitted to medicine with the primary diagnosis of Pneumonia      HOSPITAL DAY: 2d     OVERNIGHT EVENTS: None    TODAY: Patient was seen this morning at bedside. Currently, the patient reports no complaints     Review of systems is otherwise negative.    =================== OBJECTIVE ===================    VITAL SIGNS: Last 24 Hours  T(C): 36.1 (27 Feb 2019 04:47), Max: 36.2 (26 Feb 2019 19:34)  T(F): 97 (27 Feb 2019 04:47), Max: 97.1 (26 Feb 2019 19:34)  HR: 63 (27 Feb 2019 04:47) (63 - 71)  BP: 175/72 (27 Feb 2019 04:47) (128/62 - 175/72)  BP(mean): --  RR: 19 (27 Feb 2019 04:47) (18 - 19)  SpO2: 99% (26 Feb 2019 09:32) (99% - 99%)    02-26-19 @ 07:01  -  02-27-19 @ 06:50  --------------------------------------------------------  IN: 700 mL / OUT: 0 mL / NET: 700 mL      PHYSICAL EXAM:  GENERAL: NAD, well-developed  CHEST/LUNG: Clear to auscultation bilaterally; No wheeze, no crackles.   HEART: Regular rate and rhythm; No murmurs, rubs, or gallops  ABDOMEN: Soft, Nontender, Nondistended; Bowel sounds present  EXTREMITIES:  2+ Peripheral Pulses, No clubbing, cyanosis, or edema  PSYCH: AAOx3  NEUROLOGY: non-focal  General:  Appearance is consistent with chronologic age.  No abnormal facies.   General: AA&Ox3.  Fund of knowledge is intact and normal.  Language with normal repetition, comprehension and naming.   SKIN: No rashes or lesions    ===================== LABS =====================                        7.3    110.30 )-----------( 101      ( 26 Feb 2019 12:15 )             24.9     02-26    140  |  102  |  28<H>  ----------------------------<  147<H>  4.2   |  23  |  1.1    Ca    8.5      26 Feb 2019 12:15  Mg     2.7     02-26    TPro  5.4<L>  /  Alb  3.5  /  TBili  0.4  /  DBili  x   /  AST  30  /  ALT  24  /  AlkPhos  67  02-26    PT/INR - ( 25 Feb 2019 13:19 )   PT: 14.20 sec;   INR: 1.24 ratio         PTT - ( 25 Feb 2019 13:19 )  PTT:25.7 sec        CARDIAC MARKERS ( 25 Feb 2019 13:19 )  x     / <0.01 ng/mL / x     / x     / x        ================== IMAGING ==================    < from: Xray Chest 2 Views PA/Lat (02.25.19 @ 14:01) >  Impression:    Right lower lobe opacity consistent with pneumonia. Small pleural   effusion.     No pneumothorax    < end of copied text >      ================== ALLERGIES ===================  No Known Allergies    ==================== MEDS =====================  amLODIPine   Tablet 5 milliGRAM(s) Oral daily  atorvastatin 40 milliGRAM(s) Oral at bedtime  cefepime   IVPB      cefepime   IVPB 1000 milliGRAM(s) IV Intermittent every 8 hours  heparin  Injectable 5000 Unit(s) SubCutaneous every 8 hours  losartan 100 milliGRAM(s) Oral daily  vancomycin  IVPB 1000 milliGRAM(s) IV Intermittent every 12 hours    PRN MEDICATIONS  acetaminophen   Tablet .. 650 milliGRAM(s) Oral every 6 hours PRN      ================= ASSESS/PLAN ==================  Patient is a 78y old Male who presents with a chief complaint of SOB/ cough (25 Feb 2019 16:05)  Currently admitted to medicine with the primary diagnosis of Pneumonia      PLAN  #Sepsis 2/2 CAP  CURB 65 of 2, rest pulse ox is 97%, ambulation is 96%   was ceftriaxone and azithromycin day 2  Nasal swab for flu negative   - Cefepime, vanco (Day 2)   - Blood cultures pending     #CLL  currently off chemotherapy  fu with oncologist as OP    #MAcrocytic Anemia/ thrombocytopenia  likely 2/2 CLL  Hemoglbin stable  - Will call dr Gusman in am for baseline CBC  - TSH, T4, folate and B12 pending     #HTN  - c/w losartan and amlodipine    #Elevated Crea - RICCO vs CKD. No baseline  Downtrending   - Daily BMP     GI PPX: -  DVT PPX: Heparin     DIET: DASH  ACTIVITY:  () Ad Eloina  /  (X) Advance as Tolerated  /  () Bed Rest  /  () Fall Precaution  /  () Seizure precaution    ================= PRESENT TODAY ==================    1-Caldera Catheter: No  Indication:  2-Vascular Access:  [X]Peripheral | Indication: Antibiotics   []Midline | Indication:   []PICC Line | Indication:  []CVC | Indication:  []Arterial Line | Indication:  []Uldall Catheter | Indication:   3-IV Fluids: | Indication:  4-Ventilation: NC 2L | Sat: 98%     ================= DISPOSITION ==================    Patient to be discharged when condition(s) optimized.            Discharge to:  (X) Home  /  () SNF  /  () [facility] /  () Hospice / () Other     ================= CODE STATUS =================                  (X) FULL CODE     |     () DNR     |     () DNI    () Discussion with patient and/or family regarding goals of care ________________________________________________________________________________  DAILY PROGRESS NOTE:    ================== SUBJECTIVE ==================    SHI VELA  /   78y  /  Male  /  MRN#: [id number]  Patient is a 78y old Male who presents with a chief complaint of SOB/ cough (26 Feb 2019 13:08)  Currently admitted to medicine with the primary diagnosis of Pneumonia      HOSPITAL DAY: 2d     OVERNIGHT EVENTS: None    TODAY: Patient was seen this morning at bedside. Currently, the patient reports no complaints     Review of systems is otherwise negative.    =================== OBJECTIVE ===================    VITAL SIGNS: Last 24 Hours  T(C): 36.1 (27 Feb 2019 04:47), Max: 36.2 (26 Feb 2019 19:34)  T(F): 97 (27 Feb 2019 04:47), Max: 97.1 (26 Feb 2019 19:34)  HR: 63 (27 Feb 2019 04:47) (63 - 71)  BP: 175/72 (27 Feb 2019 04:47) (128/62 - 175/72)  BP(mean): --  RR: 19 (27 Feb 2019 04:47) (18 - 19)  SpO2: 99% (26 Feb 2019 09:32) (99% - 99%)    02-26-19 @ 07:01  -  02-27-19 @ 06:50  --------------------------------------------------------  IN: 700 mL / OUT: 0 mL / NET: 700 mL    PHYSICAL EXAM:  GENERAL: NAD, well-developed  CHEST/LUNG: Clear to auscultation bilaterally; No wheeze, no crackles.   HEART: Regular rate and rhythm; No murmurs, rubs, or gallops  ABDOMEN: Soft, Nontender, Nondistended; Bowel sounds present  EXTREMITIES:  2+ Peripheral Pulses, No clubbing, cyanosis, or edema  PSYCH: AAOx3  NEUROLOGY: non-focal  General:  Appearance is consistent with chronologic age.  No abnormal facies.   General: AA&Ox3.  Fund of knowledge is intact and normal.  Language with normal repetition, comprehension and naming.   SKIN: No rashes or lesions    ===================== LABS =====================                        7.3    110.30 )-----------( 101      ( 26 Feb 2019 12:15 )             24.9     02-26    140  |  102  |  28<H>  ----------------------------<  147<H>  4.2   |  23  |  1.1    Ca    8.5      26 Feb 2019 12:15  Mg     2.7     02-26    TPro  5.4<L>  /  Alb  3.5  /  TBili  0.4  /  DBili  x   /  AST  30  /  ALT  24  /  AlkPhos  67  02-26    PT/INR - ( 25 Feb 2019 13:19 )   PT: 14.20 sec;   INR: 1.24 ratio       PTT - ( 25 Feb 2019 13:19 )  PTT:25.7 sec    CARDIAC MARKERS ( 25 Feb 2019 13:19 )  x     / <0.01 ng/mL / x     / x     / x        ================== IMAGING ==================    < from: Xray Chest 2 Views PA/Lat (02.25.19 @ 14:01) >  Impression:    Right lower lobe opacity consistent with pneumonia. Small pleural   effusion.     No pneumothorax    < end of copied text >    ================== ALLERGIES ===================  No Known Allergies    ==================== MEDS =====================  amLODIPine   Tablet 5 milliGRAM(s) Oral daily  atorvastatin 40 milliGRAM(s) Oral at bedtime  cefepime   IVPB      cefepime   IVPB 1000 milliGRAM(s) IV Intermittent every 8 hours  heparin  Injectable 5000 Unit(s) SubCutaneous every 8 hours  losartan 100 milliGRAM(s) Oral daily  vancomycin  IVPB 1000 milliGRAM(s) IV Intermittent every 12 hours    PRN MEDICATIONS  acetaminophen   Tablet .. 650 milliGRAM(s) Oral every 6 hours PRN      ================= ASSESS/PLAN ==================  Patient is a 78y old Male who presents with a chief complaint of SOB/ cough (25 Feb 2019 16:05)  Currently admitted to medicine with the primary diagnosis of Pneumonia      PLAN  #Sepsis 2/2 gram negative pneumonia 2/2 immunocompromised status secondary to CLL    CURB 65 of 2, rest pulse ox is 97%, ambulation is 96%   was ceftriaxone and azithromycin day 2  Nasal swab for flu negative   - Cefepime, vanco (Day 2)   - Blood cultures pending     #CLL  currently off chemotherapy  fu with oncologist as OP    #MAcrocytic Anemia/ thrombocytopenia  likely 2/2 CLL  Hemoglbin stable  - Will call dr Gusman in am for baseline CBC  - TSH, T4, folate and B12 pending     #HTN  - c/w losartan and amlodipine    #Elevated Crea - RICCO vs CKD. No baseline  Downtrending   - Daily BMP     GI PPX: -  DVT PPX: Heparin     DIET: DASH  ACTIVITY:  () Ad Eloina  /  (X) Advance as Tolerated  /  () Bed Rest  /  () Fall Precaution  /  () Seizure precaution    ================= PRESENT TODAY ==================    1-Caldera Catheter: No  Indication:  2-Vascular Access:  [X]Peripheral | Indication: Antibiotics   []Midline | Indication:   []PICC Line | Indication:  []CVC | Indication:  []Arterial Line | Indication:  []Uldall Catheter | Indication:   3-IV Fluids: | Indication:  4-Ventilation: NC 2L | Sat: 98%     ================= DISPOSITION ==================    Patient to be discharged when condition(s) optimized.            Discharge to:  (X) Home  /  () SNF  /  () [facility] /  () Hospice / () Other     ================= CODE STATUS =================                  (X) FULL CODE     |     () DNR     |     () DNI    () Discussion with patient and/or family regarding goals of care

## 2019-02-27 NOTE — PROGRESS NOTE ADULT - ASSESSMENT
79 yo M with CLL not on chemotherapy, HTN and DLD presenting with worsening SOB and cough x4 days. Pt also reports fever Tmax 102 x1 day duration. no chills. + productive cough of brownish sputum, he was found to have RLL PNA, treated with broad spectrum ABx, ID consulted.   On admission he was anemic ( has h/o chronic anemia) and thrombocytopenic with elevated WBC count with known h/o CLL, WBC are trending down, pt is clinically improving.   BNP was elevated on admission, pt denies any cardiac history, will get 2DECHO and LE Doppler ( pt had trace LE edema on LE).    #Progress Note Handoff  Pending (specify):  Consults___ID ______, Tests__2Decho, LE Doppler ______, Test Results_______, Other_________  Family discussion: no family is available, pt alert   Disposition: Home_x in 24 hours __/SNF___/Other________/Unknown at this time________

## 2019-02-27 NOTE — PROGRESS NOTE ADULT - SUBJECTIVE AND OBJECTIVE BOX
Patient is a 78y old  Male who presents with a chief complaint of SOB/ cough, was found to have PNA, treated with broad spectrum ABx, clinically improved , off oxygen.   Today pt is c/o cough, feels better.       Vital Signs Last 24 Hrs  T(C): 36.4 (27 Feb 2019 14:46), Max: 36.4 (27 Feb 2019 14:46)  T(F): 97.5 (27 Feb 2019 14:46), Max: 97.5 (27 Feb 2019 14:46)  HR: 68 (27 Feb 2019 14:46) (63 - 71)  BP: 138/66 (27 Feb 2019 14:46) (138/66 - 175/72)  RR: 19 (27 Feb 2019 04:47) (18 - 19)  SpO2: 93% (27 Feb 2019 08:20) (93% - 93%)    PHYSICAL EXAM:  GENERAL: NAD, pale   HEAD:  Atraumatic, Normocephalic  NECK: Supple, No JVD, Normal thyroid  NERVOUS SYSTEM:  Alert & Oriented X3, Good concentration; Motor Strength 5/5 B/L upper and lower extremities; DTRs 2+ intact and symmetric  CHEST/LUNG: rales at right base   HEART: Regular rate and rhythm; No murmurs, rubs, or gallops  ABDOMEN: Soft, Nontender, Nondistended; Bowel sounds present  EXTREMITIES:  2+ Peripheral Pulses, No clubbing, cyanosis, or edema  LYMPH: No lymphadenopathy noted  SKIN: No rashes or lesions, dry and pale skin     LABS:                        7.4    113.77 )-----------( 108      ( 27 Feb 2019 08:15 )             24.7     02-27    143  |  110  |  24<H>  ----------------------------<  138<H>  4.2   |  22  |  1.1    Ca    8.6      27 Feb 2019 08:15  Mg     2.7     02-26    TPro  5.4<L>  /  Alb  3.5  /  TBili  0.4  /  DBili  x   /  AST  30  /  ALT  24  /  AlkPhos  67  02-26    RADIOLOGY & ADDITIONAL TESTS:  < from: Xray Chest 2 Views PA/Lat (02.25.19 @ 14:01) >  Impression:    Right lower lobe opacity consistent with pneumonia. Small pleural   effusion.     No pneumothorax    MEDICATIONS  (STANDING):  amLODIPine   Tablet 5 milliGRAM(s) Oral daily  atorvastatin 40 milliGRAM(s) Oral at bedtime  cefepime   IVPB      cefepime   IVPB 1000 milliGRAM(s) IV Intermittent every 8 hours  losartan 100 milliGRAM(s) Oral daily  vancomycin  IVPB 1000 milliGRAM(s) IV Intermittent every 12 hours    MEDICATIONS  (PRN):  acetaminophen   Tablet .. 650 milliGRAM(s) Oral every 6 hours PRN Temp greater or equal to 38C (100.4F)

## 2019-02-27 NOTE — CONSULT NOTE ADULT - ASSESSMENT
78M    CLL never on chemo  Subdural hematoma  HLD  HTN   History of gastric surgery  History of lung surgery: related to infection??    Admitted with fever at home, cough  Sepsis ruled out on admission   suspected GNR PNA    flu/rsv neg  CXR RLL opacity, RVP neg    - levaquin 750mg q24h  - D/C vanc  - If pt to start chemo, should be on ACV ppx 400mb BID

## 2019-02-27 NOTE — PROGRESS NOTE ADULT - PROBLEM SELECTOR PLAN 1
- suspected GN PNA  - c/w IV Abx while in the hospital, f/u ID recommendations  - nebs PRN  - pt is comfortable on RA

## 2019-02-28 ENCOUNTER — TRANSCRIPTION ENCOUNTER (OUTPATIENT)
Age: 79
End: 2019-02-28

## 2019-02-28 VITALS — RESPIRATION RATE: 17 BRPM | TEMPERATURE: 96 F

## 2019-02-28 LAB
ANION GAP SERPL CALC-SCNC: 7 MMOL/L — SIGNIFICANT CHANGE UP (ref 7–14)
BASOPHILS # BLD AUTO: 0.05 K/UL — SIGNIFICANT CHANGE UP (ref 0–0.2)
BASOPHILS NFR BLD AUTO: 0 % — SIGNIFICANT CHANGE UP (ref 0–1)
BUN SERPL-MCNC: 21 MG/DL — HIGH (ref 10–20)
CALCIUM SERPL-MCNC: 8.8 MG/DL — SIGNIFICANT CHANGE UP (ref 8.5–10.1)
CHLORIDE SERPL-SCNC: 106 MMOL/L — SIGNIFICANT CHANGE UP (ref 98–110)
CO2 SERPL-SCNC: 25 MMOL/L — SIGNIFICANT CHANGE UP (ref 17–32)
CREAT SERPL-MCNC: 0.9 MG/DL — SIGNIFICANT CHANGE UP (ref 0.7–1.5)
EOSINOPHIL # BLD AUTO: 0.24 K/UL — SIGNIFICANT CHANGE UP (ref 0–0.7)
EOSINOPHIL NFR BLD AUTO: 0.2 % — SIGNIFICANT CHANGE UP (ref 0–8)
GLUCOSE SERPL-MCNC: 131 MG/DL — HIGH (ref 70–99)
HCT VFR BLD CALC: 26 % — LOW (ref 42–52)
HGB BLD-MCNC: 7.7 G/DL — LOW (ref 14–18)
IMM GRANULOCYTES NFR BLD AUTO: 0.5 % — HIGH (ref 0.1–0.3)
LYMPHOCYTES # BLD AUTO: 122 K/UL — HIGH (ref 1.2–3.4)
LYMPHOCYTES # BLD AUTO: 91.5 % — HIGH (ref 20.5–51.1)
MCHC RBC-ENTMCNC: 29.6 G/DL — LOW (ref 32–37)
MCHC RBC-ENTMCNC: 31.6 PG — HIGH (ref 27–31)
MCV RBC AUTO: 106.6 FL — HIGH (ref 80–94)
MONOCYTES # BLD AUTO: 5.05 K/UL — HIGH (ref 0.1–0.6)
MONOCYTES NFR BLD AUTO: 3.8 % — SIGNIFICANT CHANGE UP (ref 1.7–9.3)
NEUTROPHILS # BLD AUTO: 5.24 K/UL — SIGNIFICANT CHANGE UP (ref 1.4–6.5)
NEUTROPHILS NFR BLD AUTO: 4 % — LOW (ref 42.2–75.2)
NRBC # BLD: 0 /100 WBCS — SIGNIFICANT CHANGE UP (ref 0–0)
PLATELET # BLD AUTO: 124 K/UL — LOW (ref 130–400)
POTASSIUM SERPL-MCNC: 4.4 MMOL/L — SIGNIFICANT CHANGE UP (ref 3.5–5)
POTASSIUM SERPL-SCNC: 4.4 MMOL/L — SIGNIFICANT CHANGE UP (ref 3.5–5)
RBC # BLD: 2.44 M/UL — LOW (ref 4.7–6.1)
RBC # FLD: 16.8 % — HIGH (ref 11.5–14.5)
SODIUM SERPL-SCNC: 138 MMOL/L — SIGNIFICANT CHANGE UP (ref 135–146)
WBC # BLD: 133.3 K/UL — CRITICAL HIGH (ref 4.8–10.8)
WBC # FLD AUTO: 133.3 K/UL — CRITICAL HIGH (ref 4.8–10.8)

## 2019-02-28 RX ORDER — CIPROFLOXACIN LACTATE 400MG/40ML
1 VIAL (ML) INTRAVENOUS
Qty: 5 | Refills: 0 | OUTPATIENT
Start: 2019-02-28 | End: 2019-03-04

## 2019-02-28 RX ORDER — CIPROFLOXACIN LACTATE 400MG/40ML
1 VIAL (ML) INTRAVENOUS
Qty: 8 | Refills: 0 | OUTPATIENT
Start: 2019-02-28 | End: 2019-03-07

## 2019-02-28 RX ADMIN — CEFEPIME 100 MILLIGRAM(S): 1 INJECTION, POWDER, FOR SOLUTION INTRAMUSCULAR; INTRAVENOUS at 05:51

## 2019-02-28 RX ADMIN — AMLODIPINE BESYLATE 5 MILLIGRAM(S): 2.5 TABLET ORAL at 05:51

## 2019-02-28 RX ADMIN — LOSARTAN POTASSIUM 100 MILLIGRAM(S): 100 TABLET, FILM COATED ORAL at 05:51

## 2019-02-28 NOTE — DISCHARGE NOTE ADULT - MEDICATION SUMMARY - MEDICATIONS TO TAKE
I will START or STAY ON the medications listed below when I get home from the hospital:    losartan 100 mg oral tablet  -- 1 tab(s) by mouth once a day  -- Indication: For Hypertension     lovastatin 40 mg oral tablet  -- 1 tab(s) by mouth once a day  -- Indication: For High cholesterol     amLODIPine 5 mg oral tablet  -- 1 tab(s) by mouth once a day  -- Indication: For Hypertension, unspecified type I will START or STAY ON the medications listed below when I get home from the hospital:    losartan 100 mg oral tablet  -- 1 tab(s) by mouth once a day  -- Indication: For Hypertension     lovastatin 40 mg oral tablet  -- 1 tab(s) by mouth once a day  -- Indication: For High cholesterol     amLODIPine 5 mg oral tablet  -- 1 tab(s) by mouth once a day  -- Indication: For Hypertension, unspecified type    levoFLOXacin 750 mg oral tablet  -- 1 tab(s) by mouth every 24 hours x 8 days  x 5 days   -- Indication: For Pneumonia

## 2019-02-28 NOTE — DISCHARGE NOTE ADULT - HOSPITAL COURSE
77 yo M with CLL not on chemotherapy, HTN and DLD presenting with worsening SOB and cough x4 days. Pt also reports fever Tmax 102 x1 day duration. no chills. + productive cough of brownish sputum. Admitted for sepsis secondary to pneumonia   Was treated with IV hydration, oxygen therapy and IV antibiotics  Cultures were negative. Oxygen was weaned off. Patient tolerated ambulation. No fevers   Anitbiotics were switched to PO levaquin   Regarding the CLL CBCs have been stable  He will follow up with his oncologist as outpatient

## 2019-02-28 NOTE — DISCHARGE NOTE ADULT - CARE PLAN
Principal Discharge DX:	Pneumonia  Goal:	Resolution  Assessment and plan of treatment:	You presented for sepsis secondary to community acquired pneumonia. You improved with IV hydration, antibiotics and oxygen therapy. You are currently stable, sepsis has resolved, you no longer need oxygen and you are tolerating ambulation   Continue taking the Levaquin as prescribed for 5 more days and follow up with your primary care physician within 2 weeks  Secondary Diagnosis:	CLL (chronic lymphocytic leukemia)  Goal:	Stability  Assessment and plan of treatment:	Stable. You white count, anemia and platelet counts have been stable. Follow up with your hematologist after discharge for future treatments. IF you are to start chemotherapy ask about the need to start acyclovir for viral infection prevention Principal Discharge DX:	Pneumonia  Goal:	Resolution  Assessment and plan of treatment:	You presented for sepsis secondary to community acquired pneumonia. You improved with IV hydration, antibiotics and oxygen therapy. You are currently stable, sepsis has resolved, you no longer need oxygen and you are tolerating ambulation   Continue taking the Levaquin as prescribed for 8 more days and follow up with your primary care physician within 2 weeks  Secondary Diagnosis:	CLL (chronic lymphocytic leukemia)  Goal:	Stability  Assessment and plan of treatment:	Stable. You white count, anemia and platelet counts have been stable. Follow up with your hematologist after discharge for future treatments. IF you are to start chemotherapy ask about the need to start acyclovir for viral infection prevention

## 2019-02-28 NOTE — DISCHARGE NOTE ADULT - PROVIDER TOKENS
FREE:[LAST:[Bari],FIRST:[Moi],PHONE:[phone]],FAX:[(   [phone]    -],ADDRESS:[[address] [place] [address] [address] [id number]]]

## 2019-02-28 NOTE — PROGRESS NOTE ADULT - ASSESSMENT
________________________________________________________________________________  DAILY PROGRESS NOTE:    ================== SUBJECTIVE ==================    SHI VELA  /   78y  /  Male  /  MRN#: [id number]  Patient is a 78y old Male who presents with a chief complaint of SOB/ cough (27 Feb 2019 16:30)  Currently admitted to medicine with the primary diagnosis of Pneumonia    HOSPITAL DAY: 3d     OVERNIGHT EVENTS: None    TODAY: Patient was seen this morning at bedside. Currently, the patient reports no complaints     Review of systems is otherwise negative.    =================== OBJECTIVE ===================    VITAL SIGNS: Last 24 Hours  T(C): 36.8 (28 Feb 2019 05:11), Max: 36.8 (28 Feb 2019 05:11)  T(F): 98.3 (28 Feb 2019 05:11), Max: 98.3 (28 Feb 2019 05:11)  HR: 66 (28 Feb 2019 05:11) (66 - 74)  BP: 132/62 (28 Feb 2019 05:11) (132/62 - 149/66)  BP(mean): --  RR: 18 (28 Feb 2019 05:11) (18 - 18)  SpO2: 93% (27 Feb 2019 08:20) (93% - 93%)    PHYSICAL EXAM:  GENERAL: NAD, well-developed  CHEST/LUNG: Clear to auscultation bilaterally; No wheeze, no crackles.   HEART: Regular rate and rhythm; No murmurs, rubs, or gallops  ABDOMEN: Soft, Nontender, Nondistended; Bowel sounds present  EXTREMITIES:  2+ Peripheral Pulses, No clubbing, cyanosis, or edema  PSYCH: AAOx3  NEUROLOGY: non-focal  General:  Appearance is consistent with chronologic age.  No abnormal facies.   General: AA&Ox3.  Fund of knowledge is intact and normal.  Language with normal repetition, comprehension and naming.   SKIN: No rashes or lesions    ===================== LABS =====================                        7.4    113.77 )-----------( 108      ( 27 Feb 2019 08:15 )             24.7     02-27    143  |  110  |  24<H>  ----------------------------<  138<H>  4.2   |  22  |  1.1    Ca    8.6      27 Feb 2019 08:15  Mg     2.7     02-26    TPro  5.4<L>  /  Alb  3.5  /  TBili  0.4  /  DBili  x   /  AST  30  /  ALT  24  /  AlkPhos  67  02-26    ================= MICROBIOLOGY ================    Culture - Blood (collected 26 Feb 2019 09:35)  Source: .Blood None  Preliminary Report (27 Feb 2019 18:01):    No growth to date.    ================== OTHER SIGNIFICANT FINDINGS ==================    < from: Transthoracic Echocardiogram (02.27.19 @ 16:46) >  Summary:   1. Left ventricular ejection fraction, by visual estimation, is 55 to   60%.   2. Mild mitral valve regurgitation.   3. Mild tricuspid regurgitation.   4. Estimated pulmonary artery systolic pressure is 39.7 mmHg assuming a   right atrial pressure of 10 mmHg, which is consistent with borderline   pulmonary hypertension.    < end of copied text >    ================== ALLERGIES ===================  No Known Allergies    ==================== MEDS =====================  amLODIPine   Tablet 5 milliGRAM(s) Oral daily  atorvastatin 40 milliGRAM(s) Oral at bedtime  cefepime   IVPB      cefepime   IVPB 1000 milliGRAM(s) IV Intermittent every 8 hours  levoFLOXacin  Tablet 750 milliGRAM(s) Oral every 24 hours  losartan 100 milliGRAM(s) Oral daily    PRN MEDICATIONS  acetaminophen   Tablet .. 650 milliGRAM(s) Oral every 6 hours PRN    ================= ASSESS/PLAN ==================  Patient is a 78y old Male who presents with a chief complaint of SOB/ cough (25 Feb 2019 16:05)  Currently admitted to medicine with the primary diagnosis of Pneumonia      PLAN  #Sepsis 2/2 gram negative pneumonia 2/2 immunocompromised status secondary to CLL    CURB 65 of 2, rest pulse ox is 97%, ambulation is 96%   was ceftriaxone and azithromycin day 2  Nasal swab for flu negative, RVP negative   Blood cultures   TTE unremarkable: EF 55-60%  - Cefepime, vanco (Day 3), now on Levaquin 750 daily (day 2)      #CLL  currently off chemotherapy  fu with oncologist as OP    #MAcrocytic Anemia/ thrombocytopenia  likely 2/2 CLL  Hemoglbin stable  - Will call dr Gusman in am for baseline CBC  - TSH, T4, folate and B12 pending     #HTN  - c/w losartan and amlodipine    #Elevated Crea - RICCO vs CKD. No baseline  Downtrending   - Daily BMP     GI PPX: -  DVT PPX: Heparin     DIET: DASH  ACTIVITY:  () Ad Eloina  /  (X) Advance as Tolerated  /  () Bed Rest  /  () Fall Precaution  /  () Seizure precaution    ================= PRESENT TODAY ==================    1-Caldera Catheter: No  Indication:  2-Vascular Access:  [X]Peripheral | Indication: Antibiotics   []Midline | Indication:   []PICC Line | Indication:  []CVC | Indication:  []Arterial Line | Indication:  []Uldall Catheter | Indication:   3-IV Fluids: | Indication:  4-Ventilation: NC 2L | Sat: 98%     ================= DISPOSITION ==================    Patient to be discharged when condition(s) optimized.            Discharge to:  (X) Home  /  () SNF  /  () [facility] /  () Hospice / () Other     ================= CODE STATUS =================                  (X) FULL CODE     |     () DNR     |     () DNI

## 2019-02-28 NOTE — PROGRESS NOTE ADULT - PROBLEM SELECTOR PLAN 1
- suspected GN PNA  - ID recommended po Levofloxacin , will treat for 10 days total   - nebs PRN  - pt is comfortable on RA

## 2019-02-28 NOTE — PROGRESS NOTE ADULT - PROBLEM SELECTOR PLAN 7
-  2Decho was significant for chronic diastolic CHF, borderline PHTN  - fluid restriction 1200 ml in 24 hours, daily weight at home   - DASH diet  - monitor for fluid overload

## 2019-02-28 NOTE — PROGRESS NOTE ADULT - SUBJECTIVE AND OBJECTIVE BOX
SHI VELA  78y, Male      OVERNIGHT EVENTS:  afebrile    ROS negative except as per above    VITALS:  T(F): 98.3, Max: 98.3 (02-28-19 @ 05:11)  HR: 66  BP: 132/62  RR: 18Vital Signs Last 24 Hrs  T(C): 36.8 (28 Feb 2019 05:11), Max: 36.8 (28 Feb 2019 05:11)  T(F): 98.3 (28 Feb 2019 05:11), Max: 98.3 (28 Feb 2019 05:11)  HR: 66 (28 Feb 2019 05:11) (66 - 74)  BP: 132/62 (28 Feb 2019 05:11) (132/62 - 149/66)  BP(mean): --  RR: 18 (28 Feb 2019 05:11) (18 - 18)  SpO2: 94% (28 Feb 2019 07:46) (94% - 94%)    PHYSICAL EXAM:  GENERAL: NAD, well-developed  CHEST: Clear to auscultation bilaterally; No wheeze, no crackles.   HEART: Regular rate and rhythm; No murmurs, rubs, or gallops  ABDOMEN: Soft, Nontender, Nondistended; Bowel sounds present  EXTREMITIES:  2+ Peripheral Pulses, No clubbing, cyanosis, or edema  NEUROLOGY: non-focal, AAOx3      TESTS & MEASUREMENTS:                        7.7    133.30 )-----------( 124      ( 28 Feb 2019 07:41 )             26.0     02-28    138  |  106  |  21<H>  ----------------------------<  131<H>  4.4   |  25  |  0.9    Ca    8.8      28 Feb 2019 07:41  Mg     2.7     02-26    TPro  5.4<L>  /  Alb  3.5  /  TBili  0.4  /  DBili  x   /  AST  30  /  ALT  24  /  AlkPhos  67  02-26    LIVER FUNCTIONS - ( 26 Feb 2019 12:15 )  Alb: 3.5 g/dL / Pro: 5.4 g/dL / ALK PHOS: 67 U/L / ALT: 24 U/L / AST: 30 U/L / GGT: x             Culture - Blood (collected 02-26-19 @ 09:35)  Source: .Blood None  Preliminary Report (02-27-19 @ 18:01):    No growth to date.          RADIOLOGY & ADDITIONAL TESTS:    ANTIBIOTICS:  azithromycin  IVPB   255 mL/Hr IV Intermittent (02-25-19 @ 22:55)    cefepime   IVPB   100 mL/Hr IV Intermittent (02-26-19 @ 12:07)    cefepime   IVPB   100 mL/Hr IV Intermittent (02-28-19 @ 05:51)   100 mL/Hr IV Intermittent (02-27-19 @ 21:26)   100 mL/Hr IV Intermittent (02-27-19 @ 13:09)   100 mL/Hr IV Intermittent (02-27-19 @ 05:39)   100 mL/Hr IV Intermittent (02-26-19 @ 22:32)    cefTRIAXone   IVPB   100 mL/Hr IV Intermittent (02-26-19 @ 00:21)    levoFLOXacin  Tablet   750 milliGRAM(s) Oral (02-27-19 @ 17:41)    levoFLOXacin IVPB   100 mL/Hr IV Intermittent (02-25-19 @ 15:14)    vancomycin  IVPB   250 mL/Hr IV Intermittent (02-27-19 @ 05:46)   250 mL/Hr IV Intermittent (02-26-19 @ 21:51)   250 mL/Hr IV Intermittent (02-26-19 @ 13:47)        levoFLOXacin  Tablet 750 milliGRAM(s) Oral every 24 hours    \ SHI VELA  78y, Male      OVERNIGHT EVENTS:  afebrile  decreased cough     ROS negative except as per above    VITALS:  T(F): 98.3, Max: 98.3 (02-28-19 @ 05:11)  HR: 66  BP: 132/62  RR: 18Vital Signs Last 24 Hrs  T(C): 36.8 (28 Feb 2019 05:11), Max: 36.8 (28 Feb 2019 05:11)  T(F): 98.3 (28 Feb 2019 05:11), Max: 98.3 (28 Feb 2019 05:11)  HR: 66 (28 Feb 2019 05:11) (66 - 74)  BP: 132/62 (28 Feb 2019 05:11) (132/62 - 149/66)  BP(mean): --  RR: 18 (28 Feb 2019 05:11) (18 - 18)  SpO2: 94% (28 Feb 2019 07:46) (94% - 94%)    PHYSICAL EXAM:  GENERAL: NAD, well-developed  CHEST: Clear to auscultation bilaterally; No wheeze, no crackles.   HEART: Regular rate and rhythm; No murmurs, rubs, or gallops  ABDOMEN: Soft, Nontender, Nondistended; Bowel sounds present  EXTREMITIES:  2+ Peripheral Pulses, No clubbing, cyanosis, or edema  NEUROLOGY: non-focal, AAOx3      TESTS & MEASUREMENTS:                        7.7    133.30 )-----------( 124      ( 28 Feb 2019 07:41 )             26.0     02-28    138  |  106  |  21<H>  ----------------------------<  131<H>  4.4   |  25  |  0.9    Ca    8.8      28 Feb 2019 07:41  Mg     2.7     02-26    TPro  5.4<L>  /  Alb  3.5  /  TBili  0.4  /  DBili  x   /  AST  30  /  ALT  24  /  AlkPhos  67  02-26    LIVER FUNCTIONS - ( 26 Feb 2019 12:15 )  Alb: 3.5 g/dL / Pro: 5.4 g/dL / ALK PHOS: 67 U/L / ALT: 24 U/L / AST: 30 U/L / GGT: x             Culture - Blood (collected 02-26-19 @ 09:35)  Source: .Blood None  Preliminary Report (02-27-19 @ 18:01):    No growth to date.          RADIOLOGY & ADDITIONAL TESTS:    ANTIBIOTICS:  azithromycin  IVPB   255 mL/Hr IV Intermittent (02-25-19 @ 22:55)    cefepime   IVPB   100 mL/Hr IV Intermittent (02-26-19 @ 12:07)    cefepime   IVPB   100 mL/Hr IV Intermittent (02-28-19 @ 05:51)   100 mL/Hr IV Intermittent (02-27-19 @ 21:26)   100 mL/Hr IV Intermittent (02-27-19 @ 13:09)   100 mL/Hr IV Intermittent (02-27-19 @ 05:39)   100 mL/Hr IV Intermittent (02-26-19 @ 22:32)    cefTRIAXone   IVPB   100 mL/Hr IV Intermittent (02-26-19 @ 00:21)    levoFLOXacin  Tablet   750 milliGRAM(s) Oral (02-27-19 @ 17:41)    levoFLOXacin IVPB   100 mL/Hr IV Intermittent (02-25-19 @ 15:14)    vancomycin  IVPB   250 mL/Hr IV Intermittent (02-27-19 @ 05:46)   250 mL/Hr IV Intermittent (02-26-19 @ 21:51)   250 mL/Hr IV Intermittent (02-26-19 @ 13:47)        levoFLOXacin  Tablet 750 milliGRAM(s) Oral every 24 hours    \

## 2019-02-28 NOTE — DISCHARGE NOTE ADULT - PATIENT PORTAL LINK FT
You can access the [facility] Patient Portal, offered by [facility], by registering with the following website: [email or web address]

## 2019-02-28 NOTE — DISCHARGE NOTE ADULT - PLAN OF CARE
Resolution You presented for sepsis secondary to community acquired pneumonia. You improved with IV hydration, antibiotics and oxygen therapy. You are currently stable, sepsis has resolved, you no longer need oxygen and you are tolerating ambulation   Continue taking the Levaquin as prescribed for 5 more days and follow up with your primary care physician within 2 weeks Stability Stable. You white count, anemia and platelet counts have been stable. Follow up with your hematologist after discharge for future treatments. IF you are to start chemotherapy ask about the need to start acyclovir for viral infection prevention You presented for sepsis secondary to community acquired pneumonia. You improved with IV hydration, antibiotics and oxygen therapy. You are currently stable, sepsis has resolved, you no longer need oxygen and you are tolerating ambulation   Continue taking the Levaquin as prescribed for 8 more days and follow up with your primary care physician within 2 weeks

## 2019-02-28 NOTE — DISCHARGE NOTE ADULT - CARE PROVIDER_API CALL
Moi Candelaria  [address], [place] [address]  Phone: [phone]  Fax: (   )    -  Follow Up Time:

## 2019-02-28 NOTE — PROGRESS NOTE ADULT - ASSESSMENT
78M    CLL never on chemo  Subdural hematoma  HLD  HTN   History of gastric surgery  History of lung surgery: related to infection??    Admitted with fever at home, cough  Sepsis ruled out on admission   suspected GNR PNA    flu/rsv neg  CXR RLL opacity, RVP neg    - levaquin 750mg q24h, when stable d/c on PO levaquin 750mg to complete 7 day course   - If pt to start chemo, should be on ACV ppx 400mb BID

## 2019-02-28 NOTE — PROGRESS NOTE ADULT - ASSESSMENT
79 yo M with CLL not on chemotherapy, HTN and DLD presenting with worsening SOB and cough x4 days. Pt also reports fever Tmax 102 x1 day duration. no chills. + productive cough of brownish sputum, he was found to have RLL PNA, treated with broad spectrum ABx, ID consulted.   On admission he was anemic ( has h/o chronic anemia) and thrombocytopenic with elevated WBC count with known h/o CLL.  Pt clinically improved, po Abx for 7 days recommended by ID. Thrombocytopenia is resolving, pt is anemic at baseline, w/o any active bleeding. 2Decho showed chronic diastolic CHF, LE Doppler was negative for DVT.  Pt is stable for discharge home today, he was instructed to f/u with PMD, oncology and ENT after discharge.     #Progress Note Handoff  Pending (specify): none  Family discussion: no family is available, pt alert   Disposition: stable for discharge home

## 2019-02-28 NOTE — PROGRESS NOTE ADULT - SUBJECTIVE AND OBJECTIVE BOX
Patient is a 78y old  Male who presents with a chief complaint of SOB/ cough, was found to have PNA, treated with broad spectrum ABx, clinically improved , off oxygen.   Today pt is dry cough, overall feels better, asking about discharge.       Vital Signs Last 24 Hrs  T(C): 36.8 (28 Feb 2019 05:11), Max: 36.8 (28 Feb 2019 05:11)  T(F): 98.3 (28 Feb 2019 05:11), Max: 98.3 (28 Feb 2019 05:11)  HR: 66 (28 Feb 2019 05:11) (66 - 74)  BP: 132/62 (28 Feb 2019 05:11) (132/62 - 149/66)  BP(mean): --  RR: 18 (28 Feb 2019 05:11) (18 - 18)  SpO2: 94% (28 Feb 2019 07:46) (94% - 94%)    PHYSICAL EXAM:  GENERAL: NAD,  HEAD:  Atraumatic, Normocephalic  NECK: Supple, No JVD, Normal thyroid  NERVOUS SYSTEM:  Alert & Oriented X3, Good concentration; Motor Strength 5/5 B/L upper and lower extremities; DTRs 2+ intact and symmetric  CHEST/LUNG: rales at right base   HEART: Regular rate and rhythm; No murmurs, rubs, or gallops  ABDOMEN: Soft, Nontender, Nondistended; Bowel sounds present  EXTREMITIES:  2+ Peripheral Pulses, No clubbing, cyanosis, or edema  LYMPH: No lymphadenopathy noted  SKIN: No rashes or lesions, dry and pale skin     LABS:                                    7.7    133.30 )-----------( 124      ( 28 Feb 2019 07:41 )             26.0   02-28    138  |  106  |  21<H>  ----------------------------<  131<H>  4.4   |  25  |  0.9    Ca    8.8      28 Feb 2019 07:41        RADIOLOGY & ADDITIONAL TESTS:  < from: Xray Chest 2 Views PA/Lat (02.25.19 @ 14:01) >  Impression:    Right lower lobe opacity consistent with pneumonia. Small pleural   effusion.     No pneumothorax    < from: Transthoracic Echocardiogram (02.27.19 @ 16:46) >  Summary:   1. Left ventricular ejection fraction, by visual estimation, is 55 to   60%.   2. Mild mitral valve regurgitation.   3. Mild tricuspid regurgitation.   4. Estimated pulmonary artery systolic pressure is 39.7 mmHg assuming a   right atrial pressure of 10 mmHg, which is consistent with borderline   pulmonary hypertension.    < from: [facility] Duplex Lower Ext Vein Scan, Bilat (02.27.19 @ 16:00) >  Impression:    No evidence of deep venous thrombosis in the bilateral lower extremities.        MEDICATIONS  (STANDING):  amLODIPine   Tablet 5 milliGRAM(s) Oral daily  atorvastatin 40 milliGRAM(s) Oral at bedtime  levoFLOXacin  Tablet 750 milliGRAM(s) Oral every 24 hours  losartan 100 milliGRAM(s) Oral daily    MEDICATIONS  (PRN):  acetaminophen   Tablet .. 650 milliGRAM(s) Oral every 6 hours PRN Temp greater or equal to 38C (100.4F)

## 2019-03-01 LAB
LEGIONELLA AG UR QL: NEGATIVE — SIGNIFICANT CHANGE UP
S PNEUM AG UR QL: NEGATIVE — SIGNIFICANT CHANGE UP

## 2019-03-03 LAB
CULTURE RESULTS: SIGNIFICANT CHANGE UP
SPECIMEN SOURCE: SIGNIFICANT CHANGE UP

## 2019-03-04 PROBLEM — I10 ESSENTIAL (PRIMARY) HYPERTENSION: Chronic | Status: ACTIVE | Noted: 2019-02-25

## 2019-03-04 PROBLEM — E78.5 HYPERLIPIDEMIA, UNSPECIFIED: Chronic | Status: ACTIVE | Noted: 2019-02-25

## 2019-03-04 PROBLEM — C91.90 LYMPHOID LEUKEMIA, UNSPECIFIED NOT HAVING ACHIEVED REMISSION: Chronic | Status: ACTIVE | Noted: 2019-02-25

## 2019-03-04 PROBLEM — S06.5X9A TRAUMATIC SUBDURAL HEMORRHAGE WITH LOSS OF CONSCIOUSNESS OF UNSPECIFIED DURATION, INITIAL ENCOUNTER: Chronic | Status: ACTIVE | Noted: 2019-02-25

## 2019-03-05 DIAGNOSIS — D63.0 ANEMIA IN NEOPLASTIC DISEASE: ICD-10-CM

## 2019-03-05 DIAGNOSIS — J15.6 PNEUMONIA DUE TO OTHER GRAM-NEGATIVE BACTERIA: ICD-10-CM

## 2019-03-05 DIAGNOSIS — C91.10 CHRONIC LYMPHOCYTIC LEUKEMIA OF B-CELL TYPE NOT HAVING ACHIEVED REMISSION: ICD-10-CM

## 2019-03-05 DIAGNOSIS — I11.0 HYPERTENSIVE HEART DISEASE WITH HEART FAILURE: ICD-10-CM

## 2019-03-05 DIAGNOSIS — A41.9 SEPSIS, UNSPECIFIED ORGANISM: ICD-10-CM

## 2019-03-05 DIAGNOSIS — I50.32 CHRONIC DIASTOLIC (CONGESTIVE) HEART FAILURE: ICD-10-CM

## 2019-03-05 DIAGNOSIS — N40.0 BENIGN PROSTATIC HYPERPLASIA WITHOUT LOWER URINARY TRACT SYMPTOMS: ICD-10-CM

## 2019-03-05 DIAGNOSIS — D69.59 OTHER SECONDARY THROMBOCYTOPENIA: ICD-10-CM

## 2019-03-05 DIAGNOSIS — E87.70 FLUID OVERLOAD, UNSPECIFIED: ICD-10-CM

## 2019-03-05 DIAGNOSIS — R06.02 SHORTNESS OF BREATH: ICD-10-CM

## 2019-03-05 DIAGNOSIS — E78.5 HYPERLIPIDEMIA, UNSPECIFIED: ICD-10-CM

## 2019-03-05 DIAGNOSIS — N17.9 ACUTE KIDNEY FAILURE, UNSPECIFIED: ICD-10-CM

## 2019-03-13 ENCOUNTER — APPOINTMENT (OUTPATIENT)
Dept: OTOLARYNGOLOGY | Facility: CLINIC | Age: 79
End: 2019-03-13
Payer: MEDICARE

## 2019-03-13 PROCEDURE — 92557 COMPREHENSIVE HEARING TEST: CPT

## 2019-03-13 PROCEDURE — 99213 OFFICE O/P EST LOW 20 MIN: CPT | Mod: 25

## 2019-03-13 PROCEDURE — 92570 ACOUSTIC IMMITANCE TESTING: CPT

## 2019-03-13 RX ORDER — FLUTICASONE PROPIONATE 50 UG/1
50 SPRAY, METERED NASAL DAILY
Qty: 1 | Refills: 2 | Status: ACTIVE | COMMUNITY
Start: 2019-03-13 | End: 1900-01-01

## 2019-03-13 NOTE — ASSESSMENT
[FreeTextEntry1] : - discussed options for management of bilateral serous effusions, myringotomy with bilateral PE tube placement versus watchful waiting. He would like to wait. Encouraged frequent valsalva, will start flonase\par - f/up in 1 month, if persistent fluid, will proceed with myringotomy and PET placement

## 2019-03-13 NOTE — DATA REVIEWED
[de-identified] : 8/3/18: asymmetry noted in bone conduction threshold 1-4Khz an in WR scores with left ear poorer than right ear. Right: mild SNHL/borderline normal hearing 250-2000Hz precipitously sloping to moderate to severe SNHL therafter. Left ear: mod-severe to severe/profound mixed -8khz. Normal hearing 250 hz sloping to mod to mod/severe SNHL through 6Khz and to severe HL at 8kHz bilaterally. Type A tymp on right, rounded temp with negative pressure, normal compliance on left. \par \par 11/9/18: slight asymmetry noted at 2Khz with left ear poorer than right. Right ear: mild SNHL/borderline normal 250-2khz sloping to mod to severe thereafter. Left ear mild SNHL 250-2khz (conductive at 250hz) sloping to mod-severe to severe SNHL thereafter. TYpe A tymps bilaterally\par \par 3/13/19: decline on BC thresholds 250-4khz compared to 11/9/18 audio. R: mod-severe to mod mixed -2khz sloping to profound mixe HL therafter. Could not complete masked BC 1-4khz due to masking dilemma. Left severe to profound mixed -8khz. Type B tymps bilaterally Consent 1/Introductory Paragraph: The rationale for Mohs was explained to the patient and consent was obtained. The risks, benefits and alternatives to therapy were discussed in detail. Specifically, the risks of infection, scarring, bleeding, prolonged wound healing, incomplete removal, allergy to anesthesia, nerve injury and recurrence were addressed. Prior to the procedure, the treatment site was clearly identified and confirmed by the patient. All components of Universal Protocol/PAUSE Rule completed.

## 2019-03-13 NOTE — CONSULT LETTER
[Dear  ___] : Dear  [unfilled], [Consult Letter:] : I had the pleasure of evaluating your patient, [unfilled]. [Please see my note below.] : Please see my note below. [Consult Closing:] : Thank you very much for allowing me to participate in the care of this patient.  If you have any questions, please do not hesitate to contact me. [Sincerely,] : Sincerely, [FreeTextEntry2] : Moi Candelaria MD  [FreeTextEntry3] : Adry Quiroz MD\par Otolaryngology - Head & Neck Surgery\par

## 2019-03-13 NOTE — HISTORY OF PRESENT ILLNESS
[de-identified] : Patient here today c/o left ear hearing loss for a few months now. Patient admits not hearing well as he should be from that ear. Patient was told from physician to have excessive wax build up. Patient denies any tinnitus. No dizziness. Patient admits this morning he did have some slight pain in his left ear. Prior to this no pain. Denies hx ear infections, ear surgeries. Has not had ears cleaned before. \par \par 11/9/18 Patient is following up for conductive hearing loss. Patient also had CT done on 8/22/18. Patient states his hearing has improved slightly in the left ear. He underwent CT temporal bone. \par \par He has CLL, not currently in treatment.  [FreeTextEntry1] : \par 3/13/19 Patient is following up for Conductive hearing loss. On Feb 25th 2019 he was admitted to hospital for bacterial pneumonia, discharged several days later. While in hospital, he developed bilateral loss of hearing left worse than right. He completed outpatient abx for PNA. He started wearing hearing aids since previous visit.

## 2019-03-13 NOTE — REASON FOR VISIT
[Subsequent Evaluation] : a subsequent evaluation for [FreeTextEntry2] : Conductive hearing loss followup

## 2019-03-19 ENCOUNTER — APPOINTMENT (OUTPATIENT)
Dept: OTOLARYNGOLOGY | Facility: CLINIC | Age: 79
End: 2019-03-19
Payer: MEDICARE

## 2019-03-19 VITALS
SYSTOLIC BLOOD PRESSURE: 134 MMHG | HEIGHT: 68 IN | DIASTOLIC BLOOD PRESSURE: 82 MMHG | BODY MASS INDEX: 29.55 KG/M2 | WEIGHT: 195 LBS

## 2019-03-19 DIAGNOSIS — H61.23 IMPACTED CERUMEN, BILATERAL: ICD-10-CM

## 2019-03-19 PROCEDURE — 99214 OFFICE O/P EST MOD 30 MIN: CPT | Mod: 25

## 2019-03-19 PROCEDURE — 69433 CREATE EARDRUM OPENING: CPT | Mod: 50

## 2019-03-19 PROCEDURE — 69210 REMOVE IMPACTED EAR WAX UNI: CPT | Mod: 59

## 2019-03-19 NOTE — PHYSICAL EXAM
[de-identified] : bilateral impacted wax cleaned with curette [Midline] : trachea located in midline position [Normal] : no rashes

## 2019-03-19 NOTE — HISTORY OF PRESENT ILLNESS
[FreeTextEntry1] : Patient following up on otitis media w/ effusion. Patient was told by Dr. Quiroz to possibly have tubes put in next visit. Patient is traveling 4/1 and was told unable to travel by oncology.

## 2019-03-19 NOTE — ASSESSMENT
[FreeTextEntry1] : Bilateral ears drained today. \par Patient to come back in 10 days to see Dr Quiroz for tubes if fluid recurs.

## 2019-03-27 ENCOUNTER — APPOINTMENT (OUTPATIENT)
Dept: OTOLARYNGOLOGY | Facility: CLINIC | Age: 79
End: 2019-03-27
Payer: MEDICARE

## 2019-03-27 PROCEDURE — 99213 OFFICE O/P EST LOW 20 MIN: CPT | Mod: 25

## 2019-03-27 PROCEDURE — 69433 CREATE EARDRUM OPENING: CPT | Mod: 50,58

## 2019-03-27 RX ORDER — DEXAMETHASONE SODIUM PHOSPHATE 1 MG/ML
0.1 SOLUTION/ DROPS OPHTHALMIC
Qty: 1 | Refills: 2 | Status: ACTIVE | COMMUNITY
Start: 2019-03-27 | End: 1900-01-01

## 2019-03-27 NOTE — HISTORY OF PRESENT ILLNESS
[de-identified] : Patient following up on otitis media w/ effusion. Patient was told by Dr. Quiroz to possibly have tubes put in next visit. Patient is traveling 4/1 and was told unable to travel by oncology.  [FreeTextEntry1] : \par 3/27/19 Patient is following up for otitis media w/ effusion. Patient states he has no improvements.

## 2019-03-27 NOTE — ASSESSMENT
[FreeTextEntry1] : - use ofloxin drops ear drops BID 5 drops for 3 weeks\par - use dexamethasone ear drops BID 5 drops for 3 weeks\par - f/up in 2-3 months\par - ok to travel by plane

## 2019-03-27 NOTE — REASON FOR VISIT
[Subsequent Evaluation] : a subsequent evaluation for [FreeTextEntry2] : otitis media w/ effusion f/up

## 2019-04-24 ENCOUNTER — APPOINTMENT (OUTPATIENT)
Dept: OTOLARYNGOLOGY | Facility: CLINIC | Age: 79
End: 2019-04-24
Payer: MEDICARE

## 2019-04-24 DIAGNOSIS — H65.90 UNSPECIFIED NONSUPPURATIVE OTITIS MEDIA, UNSPECIFIED EAR: ICD-10-CM

## 2019-04-24 DIAGNOSIS — H90.2 CONDUCTIVE HEARING LOSS, UNSPECIFIED: ICD-10-CM

## 2019-04-24 PROCEDURE — 99212 OFFICE O/P EST SF 10 MIN: CPT | Mod: 25

## 2019-04-24 PROCEDURE — 92570 ACOUSTIC IMMITANCE TESTING: CPT

## 2019-04-24 PROCEDURE — 92557 COMPREHENSIVE HEARING TEST: CPT

## 2019-04-24 NOTE — CONSULT LETTER
[Dear  ___] : Dear  [unfilled], [Please see my note below.] : Please see my note below. [Consult Letter:] : I had the pleasure of evaluating your patient, [unfilled]. [Consult Closing:] : Thank you very much for allowing me to participate in the care of this patient.  If you have any questions, please do not hesitate to contact me. [Sincerely,] : Sincerely, [FreeTextEntry2] : Moi Candelaria MD  [FreeTextEntry3] : Adry Quiroz MD\par Otolaryngology - Head & Neck Surgery\par

## 2019-04-24 NOTE — DATA REVIEWED
[de-identified] : 4/24/19: improvement of AC thresholds 500-4khz in right ear and from 250-6khz in left ear. right ear; mod/mod-severe mixed -3khz sloping to severe to profound mixed HL thereafter. left ear mod, mixed -1khz sloping to mod-severe to profound SNHL therafter. type B tymps bilaterally.

## 2019-04-24 NOTE — REASON FOR VISIT
[Subsequent Evaluation] : a subsequent evaluation for [FreeTextEntry2] : hearing loss, otitis media w/ effusion

## 2019-04-24 NOTE — HISTORY OF PRESENT ILLNESS
[FreeTextEntry1] : 4/24/19: Patient following up on otitis media w/ effusion s/p PE tube placement 3/27/19. Patient admits some improvement with his hearing. Patient has finished drops and antibiotics. Patient admits he had no issues while traveling on the plane. Has had CT temporal bone done 2018, opacification of mastoid and middle ears, no coalescence, bony erosion. [de-identified] : Patient following up on otitis media w/ effusion. Patient was told by Dr. Quiroz to possibly have tubes put in next visit. Patient is traveling 4/1 and was told unable to travel by oncology. \par \par \par 3/27/19 Patient is following up for otitis media w/ effusion. Patient states he has no improvements.

## 2019-04-24 NOTE — PHYSICAL EXAM
[de-identified] : bilateral PE tubes in appropriate position in TMs, no otorrhea, tubes patent [Midline] : trachea located in midline position [Normal] : no rashes

## 2019-04-24 NOTE — ASSESSMENT
[FreeTextEntry1] : - hearing has improved with placement of PE tubes however he continues to have mixed HL bilaterally, CT temporal bone shows opacification, no bony erosion.\par - cleared for hearing aids. \par - f/up in 6 months

## 2019-05-09 ENCOUNTER — OUTPATIENT (OUTPATIENT)
Dept: OUTPATIENT SERVICES | Facility: HOSPITAL | Age: 79
LOS: 1 days | Discharge: HOME | End: 2019-05-09

## 2019-05-09 DIAGNOSIS — H90.8 MIXED CONDUCTIVE AND SENSORINEURAL HEARING LOSS, UNSPECIFIED: ICD-10-CM

## 2019-05-09 DIAGNOSIS — Z98.890 OTHER SPECIFIED POSTPROCEDURAL STATES: Chronic | ICD-10-CM

## 2019-05-10 ENCOUNTER — OTHER (OUTPATIENT)
Age: 79
End: 2019-05-10

## 2019-05-10 RX ORDER — OFLOXACIN OTIC 3 MG/ML
0.3 SOLUTION AURICULAR (OTIC)
Qty: 1 | Refills: 2 | Status: ACTIVE | COMMUNITY
Start: 2019-03-19 | End: 1900-01-01

## 2019-10-10 ENCOUNTER — APPOINTMENT (OUTPATIENT)
Dept: OTOLARYNGOLOGY | Facility: CLINIC | Age: 79
End: 2019-10-10

## 2019-10-29 ENCOUNTER — APPOINTMENT (OUTPATIENT)
Dept: UROLOGY | Facility: CLINIC | Age: 79
End: 2019-10-29

## 2023-08-28 NOTE — CONSULT NOTE ADULT - MINUTES
45 O-T Advancement Flap Text: The defect edges were debeveled with a #15 scalpel blade. Given the location of the defect, shape of the defect and the proximity to free margins an O-T advancement flap was deemed most appropriate. Using a sterile surgical marker, an appropriate advancement flap was drawn incorporating the defect and placing the expected incisions within the relaxed skin tension lines where possible. The area thus outlined was incised deep to adipose tissue with a #15 scalpel blade. The skin margins were undermined to an appropriate distance in all directions utilizing iris scissors. Following this, the designed flap was advanced and carried over into the primary defect and sutured into place.